# Patient Record
Sex: MALE | Race: BLACK OR AFRICAN AMERICAN | Employment: UNEMPLOYED | URBAN - METROPOLITAN AREA
[De-identification: names, ages, dates, MRNs, and addresses within clinical notes are randomized per-mention and may not be internally consistent; named-entity substitution may affect disease eponyms.]

---

## 2017-06-05 ENCOUNTER — APPOINTMENT (OUTPATIENT)
Dept: CT IMAGING | Age: 37
End: 2017-06-05
Attending: EMERGENCY MEDICINE
Payer: MEDICAID

## 2017-06-05 ENCOUNTER — HOSPITAL ENCOUNTER (EMERGENCY)
Age: 37
Discharge: HOME | End: 2017-06-05
Attending: EMERGENCY MEDICINE
Payer: MEDICAID

## 2017-06-05 VITALS
RESPIRATION RATE: 16 BRPM | WEIGHT: 270.06 LBS | HEART RATE: 86 BPM | SYSTOLIC BLOOD PRESSURE: 178 MMHG | OXYGEN SATURATION: 100 % | TEMPERATURE: 98.3 F | DIASTOLIC BLOOD PRESSURE: 101 MMHG | HEIGHT: 74 IN | BODY MASS INDEX: 34.66 KG/M2

## 2017-06-05 DIAGNOSIS — R51.9 NONINTRACTABLE HEADACHE, UNSPECIFIED CHRONICITY PATTERN, UNSPECIFIED HEADACHE TYPE: Primary | ICD-10-CM

## 2017-06-05 DIAGNOSIS — J45.901 ASTHMA WITH ACUTE EXACERBATION, UNSPECIFIED ASTHMA SEVERITY: ICD-10-CM

## 2017-06-05 DIAGNOSIS — I15.9 SECONDARY HYPERTENSION: ICD-10-CM

## 2017-06-05 LAB
ALBUMIN SERPL BCP-MCNC: 3.7 G/DL (ref 3.4–5)
ALBUMIN/GLOB SERPL: 1.1 {RATIO} (ref 0.8–1.7)
ALP SERPL-CCNC: 59 U/L (ref 45–117)
ALT SERPL-CCNC: 23 U/L (ref 16–61)
ANION GAP BLD CALC-SCNC: 6 MMOL/L (ref 3–18)
AST SERPL W P-5'-P-CCNC: 18 U/L (ref 15–37)
BILIRUB SERPL-MCNC: 0.3 MG/DL (ref 0.2–1)
BUN SERPL-MCNC: 13 MG/DL (ref 7–18)
BUN/CREAT SERPL: 10 (ref 12–20)
CALCIUM SERPL-MCNC: 8.9 MG/DL (ref 8.5–10.1)
CHLORIDE SERPL-SCNC: 107 MMOL/L (ref 100–108)
CO2 SERPL-SCNC: 29 MMOL/L (ref 21–32)
CREAT SERPL-MCNC: 1.24 MG/DL (ref 0.6–1.3)
ERYTHROCYTE [DISTWIDTH] IN BLOOD BY AUTOMATED COUNT: 13.2 % (ref 11.6–14.5)
GLOBULIN SER CALC-MCNC: 3.5 G/DL (ref 2–4)
GLUCOSE SERPL-MCNC: 121 MG/DL (ref 74–99)
HCT VFR BLD AUTO: 42.8 % (ref 36–48)
HGB BLD-MCNC: 14.2 G/DL (ref 13–16)
MCH RBC QN AUTO: 29.5 PG (ref 24–34)
MCHC RBC AUTO-ENTMCNC: 33.2 G/DL (ref 31–37)
MCV RBC AUTO: 89 FL (ref 74–97)
PLATELET # BLD AUTO: 208 K/UL (ref 135–420)
PMV BLD AUTO: 10.2 FL (ref 9.2–11.8)
POTASSIUM SERPL-SCNC: 3.7 MMOL/L (ref 3.5–5.5)
PROT SERPL-MCNC: 7.2 G/DL (ref 6.4–8.2)
RBC # BLD AUTO: 4.81 M/UL (ref 4.7–5.5)
SODIUM SERPL-SCNC: 142 MMOL/L (ref 136–145)
WBC # BLD AUTO: 5.5 K/UL (ref 4.6–13.2)

## 2017-06-05 PROCEDURE — 74011000250 HC RX REV CODE- 250: Performed by: EMERGENCY MEDICINE

## 2017-06-05 PROCEDURE — 74011250637 HC RX REV CODE- 250/637: Performed by: EMERGENCY MEDICINE

## 2017-06-05 PROCEDURE — 99284 EMERGENCY DEPT VISIT MOD MDM: CPT

## 2017-06-05 PROCEDURE — 94640 AIRWAY INHALATION TREATMENT: CPT

## 2017-06-05 PROCEDURE — 80053 COMPREHEN METABOLIC PANEL: CPT | Performed by: EMERGENCY MEDICINE

## 2017-06-05 PROCEDURE — 77030029684 HC NEB SM VOL KT MONA -A

## 2017-06-05 PROCEDURE — 74011636637 HC RX REV CODE- 636/637: Performed by: EMERGENCY MEDICINE

## 2017-06-05 PROCEDURE — 96361 HYDRATE IV INFUSION ADD-ON: CPT

## 2017-06-05 PROCEDURE — 85027 COMPLETE CBC AUTOMATED: CPT | Performed by: EMERGENCY MEDICINE

## 2017-06-05 PROCEDURE — 74011250636 HC RX REV CODE- 250/636: Performed by: EMERGENCY MEDICINE

## 2017-06-05 PROCEDURE — 70450 CT HEAD/BRAIN W/O DYE: CPT

## 2017-06-05 PROCEDURE — 96360 HYDRATION IV INFUSION INIT: CPT

## 2017-06-05 RX ORDER — SODIUM CHLORIDE 9 MG/ML
250 INJECTION, SOLUTION INTRAVENOUS ONCE
Status: COMPLETED | OUTPATIENT
Start: 2017-06-05 | End: 2017-06-05

## 2017-06-05 RX ORDER — ALBUTEROL SULFATE 90 UG/1
2 AEROSOL, METERED RESPIRATORY (INHALATION)
Qty: 1 INHALER | Refills: 0 | Status: SHIPPED | OUTPATIENT
Start: 2017-06-05 | End: 2018-04-17

## 2017-06-05 RX ORDER — IPRATROPIUM BROMIDE AND ALBUTEROL SULFATE 2.5; .5 MG/3ML; MG/3ML
3 SOLUTION RESPIRATORY (INHALATION)
Status: COMPLETED | OUTPATIENT
Start: 2017-06-05 | End: 2017-06-05

## 2017-06-05 RX ORDER — NIFEDIPINE 30 MG/1
30 TABLET, EXTENDED RELEASE ORAL DAILY
Qty: 15 TAB | Refills: 0 | Status: SHIPPED | OUTPATIENT
Start: 2017-06-05 | End: 2019-04-26

## 2017-06-05 RX ORDER — METOCLOPRAMIDE 5 MG/1
10 TABLET ORAL
Status: COMPLETED | OUTPATIENT
Start: 2017-06-05 | End: 2017-06-05

## 2017-06-05 RX ORDER — PROMETHAZINE HYDROCHLORIDE 25 MG/ML
25 INJECTION, SOLUTION INTRAMUSCULAR; INTRAVENOUS ONCE
Status: DISCONTINUED | OUTPATIENT
Start: 2017-06-05 | End: 2017-06-05

## 2017-06-05 RX ORDER — METOCLOPRAMIDE 5 MG/1
5 TABLET ORAL
Qty: 12 TAB | Refills: 0 | Status: SHIPPED | OUTPATIENT
Start: 2017-06-05 | End: 2017-06-15

## 2017-06-05 RX ORDER — PREDNISONE 20 MG/1
60 TABLET ORAL
Status: COMPLETED | OUTPATIENT
Start: 2017-06-05 | End: 2017-06-05

## 2017-06-05 RX ORDER — NIFEDIPINE 30 MG/1
30 TABLET, EXTENDED RELEASE ORAL
Status: COMPLETED | OUTPATIENT
Start: 2017-06-05 | End: 2017-06-05

## 2017-06-05 RX ORDER — DIPHENHYDRAMINE HCL 25 MG
25 CAPSULE ORAL
Status: COMPLETED | OUTPATIENT
Start: 2017-06-05 | End: 2017-06-05

## 2017-06-05 RX ORDER — PREDNISONE 20 MG/1
60 TABLET ORAL DAILY
Qty: 15 TAB | Refills: 0 | Status: SHIPPED | OUTPATIENT
Start: 2017-06-05 | End: 2017-06-10

## 2017-06-05 RX ADMIN — IPRATROPIUM BROMIDE AND ALBUTEROL SULFATE 3 ML: .5; 3 SOLUTION RESPIRATORY (INHALATION) at 14:41

## 2017-06-05 RX ADMIN — SODIUM CHLORIDE 250 ML/HR: 900 INJECTION, SOLUTION INTRAVENOUS at 16:32

## 2017-06-05 RX ADMIN — NIFEDIPINE 30 MG: 30 TABLET, FILM COATED, EXTENDED RELEASE ORAL at 18:18

## 2017-06-05 RX ADMIN — SODIUM CHLORIDE 1000 ML: 900 INJECTION, SOLUTION INTRAVENOUS at 16:31

## 2017-06-05 RX ADMIN — DIPHENHYDRAMINE HYDROCHLORIDE 25 MG: 25 CAPSULE ORAL at 16:27

## 2017-06-05 RX ADMIN — IPRATROPIUM BROMIDE AND ALBUTEROL SULFATE 3 ML: .5; 3 SOLUTION RESPIRATORY (INHALATION) at 17:16

## 2017-06-05 RX ADMIN — IPRATROPIUM BROMIDE AND ALBUTEROL SULFATE 3 ML: .5; 3 SOLUTION RESPIRATORY (INHALATION) at 16:28

## 2017-06-05 RX ADMIN — METOCLOPRAMIDE HYDROCHLORIDE 10 MG: 5 TABLET ORAL at 16:28

## 2017-06-05 RX ADMIN — PREDNISONE 60 MG: 20 TABLET ORAL at 16:27

## 2017-06-05 NOTE — DISCHARGE INSTRUCTIONS
Asthma Attack: Care Instructions  Your Care Instructions    During an asthma attack, the airways swell and narrow. This makes it hard to breathe. Severe asthma attacks can be life-threatening, but you can help prevent them by keeping your asthma under control and treating symptoms before they get bad. Symptoms include being short of breath, having chest tightness, coughing, and wheezing. Noting and treating these symptoms can also help you avoid future trips to the emergency room. The doctor has checked you carefully, but problems can develop later. If you notice any problems or new symptoms, get medical treatment right away. Follow-up care is a key part of your treatment and safety. Be sure to make and go to all appointments, and call your doctor if you are having problems. It's also a good idea to know your test results and keep a list of the medicines you take. How can you care for yourself at home? · Follow your asthma action plan to prevent and treat attacks. If you don't have an asthma action plan, work with your doctor to create one. · Take your asthma medicines exactly as prescribed. Talk to your doctor right away if you have any questions about how to take them. ¨ Use your quick-relief medicine when you have symptoms of an attack. Quick-relief medicine is usually an albuterol inhaler. Some people need to use quick-relief medicine before they exercise. ¨ Take your controller medicine every day, not just when you have symptoms. Controller medicine is usually an inhaled corticosteroid. The goal is to prevent problems before they occur. Don't use your controller medicine to treat an attack that has already started. It doesn't work fast enough to help. ¨ If your doctor prescribed corticosteroid pills to use during an attack, take them exactly as prescribed. It may take hours for the pills to work, but they may make the episode shorter and help you breathe better.   ¨ Keep your quick-relief medicine with you at all times. · Talk to your doctor before using other medicines. Some medicines, such as aspirin, can cause asthma attacks in some people. · If you have a peak flow meter, use it to check how well you are breathing. This can help you predict when an asthma attack is going to occur. Then you can take medicine to prevent the asthma attack or make it less severe. · Do not smoke or allow others to smoke around you. Avoid smoky places. Smoking makes asthma worse. If you need help quitting, talk to your doctor about stop-smoking programs and medicines. These can increase your chances of quitting for good. · Learn what triggers an asthma attack for you, and avoid the triggers when you can. Common triggers include colds, smoke, air pollution, dust, pollen, mold, pets, cockroaches, stress, and cold air. · Avoid colds and the flu. Get a pneumococcal vaccine shot. If you have had one before, ask your doctor if you need a second dose. Get a flu vaccine every fall. If you must be around people with colds or the flu, wash your hands often. When should you call for help? Call 911 anytime you think you may need emergency care. For example, call if:  · You have severe trouble breathing. Call your doctor now or seek immediate medical care if:  · Your symptoms do not get better after you have followed your asthma action plan. · You have new or worse trouble breathing. · Your coughing and wheezing get worse. · You cough up dark brown or bloody mucus (sputum). · You have a new or higher fever. Watch closely for changes in your health, and be sure to contact your doctor if:  · You need to use quick-relief medicine on more than 2 days a week (unless it is just for exercise). · You cough more deeply or more often, especially if you notice more mucus or a change in the color of your mucus. · You are not getting better as expected. Where can you learn more?   Go to http://bianca-arturo.info/. Enter A617 in the search box to learn more about \"Asthma Attack: Care Instructions. \"  Current as of: May 23, 2016  Content Version: 11.2  © 5388-4379 [employer]. Care instructions adapted under license by [employer] (which disclaims liability or warranty for this information). If you have questions about a medical condition or this instruction, always ask your healthcare professional. [address] any warranty or liability for your use of this information. Headache: Care Instructions  Your Care Instructions    Headaches have many possible causes. Most headaches aren't a sign of a more serious problem, and they will get better on their own. Home treatment may help you feel better faster. The doctor has checked you carefully, but problems can develop later. If you notice any problems or new symptoms, get medical treatment right away. Follow-up care is a key part of your treatment and safety. Be sure to make and go to all appointments, and call your doctor if you are having problems. It's also a good idea to know your test results and keep a list of the medicines you take. How can you care for yourself at home? · Do not drive if you have taken a prescription pain medicine. · Rest in a quiet, dark room until your headache is gone. Close your eyes and try to relax or go to sleep. Don't watch TV or read. · Put a cold, moist cloth or cold pack on the painful area for 10 to 20 minutes at a time. Put a thin cloth between the cold pack and your skin. · Use a warm, moist towel or a heating pad set on low to relax tight shoulder and neck muscles. · Have someone gently massage your neck and shoulders. · Take pain medicines exactly as directed. ¨ If the doctor gave you a prescription medicine for pain, take it as prescribed.   ¨ If you are not taking a prescription pain medicine, ask your doctor if you can take an over-the-counter medicine. · Be careful not to take pain medicine more often than the instructions allow, because you may get worse or more frequent headaches when the medicine wears off. · Do not ignore new symptoms that occur with a headache, such as a fever, weakness or numbness, vision changes, or confusion. These may be signs of a more serious problem. To prevent headaches  · Keep a headache diary so you can figure out what triggers your headaches. Avoiding triggers may help you prevent headaches. Record when each headache began, how long it lasted, and what the pain was like (throbbing, aching, stabbing, or dull). Write down any other symptoms you had with the headache, such as nausea, flashing lights or dark spots, or sensitivity to bright light or loud noise. Note if the headache occurred near your period. List anything that might have triggered the headache, such as certain foods (chocolate, cheese, wine) or odors, smoke, bright light, stress, or lack of sleep. · Find healthy ways to deal with stress. Headaches are most common during or right after stressful times. Take time to relax before and after you do something that has caused a headache in the past.  · Try to keep your muscles relaxed by keeping good posture. Check your jaw, face, neck, and shoulder muscles for tension, and try relaxing them. When sitting at a desk, change positions often, and stretch for 30 seconds each hour. · Get plenty of sleep and exercise. · Eat regularly and well. Long periods without food can trigger a headache. · Treat yourself to a massage. Some people find that regular massages are very helpful in relieving tension. · Limit caffeine by not drinking too much coffee, tea, or soda. But don't quit caffeine suddenly, because that can also give you headaches. · Reduce eyestrain from computers by blinking frequently and looking away from the computer screen every so often.  Make sure you have proper eyewear and that your monitor is set up properly, about an arm's length away. · Seek help if you have depression or anxiety. Your headaches may be linked to these conditions. Treatment can both prevent headaches and help with symptoms of anxiety or depression. When should you call for help? Call 911 anytime you think you may need emergency care. For example, call if:  · You have signs of a stroke. These may include:  ¨ Sudden numbness, paralysis, or weakness in your face, arm, or leg, especially on only one side of your body. ¨ Sudden vision changes. ¨ Sudden trouble speaking. ¨ Sudden confusion or trouble understanding simple statements. ¨ Sudden problems with walking or balance. ¨ A sudden, severe headache that is different from past headaches. Call your doctor now or seek immediate medical care if:  · You have a new or worse headache. · Your headache gets much worse. Where can you learn more? Go to http://bianca-arturo.info/. Enter M271 in the search box to learn more about \"Headache: Care Instructions. \"  Current as of: October 14, 2016  Content Version: 11.2  © 9584-0696 [employer]. Care instructions adapted under license by [employer] (which disclaims liability or warranty for this information). If you have questions about a medical condition or this instruction, always ask your healthcare professional. [address] any warranty or liability for your use of this information. High Blood Pressure: Care Instructions  Your Care Instructions  If your blood pressure is usually above 140/90, you have high blood pressure, or hypertension. That means the top number is 140 or higher or the bottom number is 90 or higher, or both. Despite what a lot of people think, high blood pressure usually doesn't cause headaches or make you feel dizzy or lightheaded. It usually has no symptoms.  But it does increase your risk for heart attack, stroke, and kidney or eye damage. The higher your blood pressure, the more your risk increases. Your doctor will give you a goal for your blood pressure. Your goal will be based on your health and your age. An example of a goal is to keep your blood pressure below 140/90. Lifestyle changes, such as eating healthy and being active, are always important to help lower blood pressure. You might also take medicine to reach your blood pressure goal.  Follow-up care is a key part of your treatment and safety. Be sure to make and go to all appointments, and call your doctor if you are having problems. It's also a good idea to know your test results and keep a list of the medicines you take. How can you care for yourself at home? Medical treatment  · If you stop taking your medicine, your blood pressure will go back up. You may take one or more types of medicine to lower your blood pressure. Be safe with medicines. Take your medicine exactly as prescribed. Call your doctor if you think you are having a problem with your medicine. · Talk to your doctor before you start taking aspirin every day. Aspirin can help certain people lower their risk of a heart attack or stroke. But taking aspirin isn't right for everyone, because it can cause serious bleeding. · See your doctor regularly. You may need to see the doctor more often at first or until your blood pressure comes down. · If you are taking blood pressure medicine, talk to your doctor before you take decongestants or anti-inflammatory medicine, such as ibuprofen. Some of these medicines can raise blood pressure. · Learn how to check your blood pressure at home. Lifestyle changes  · Stay at a healthy weight. This is especially important if you put on weight around the waist. Losing even 10 pounds can help you lower your blood pressure. · If your doctor recommends it, get more exercise. Walking is a good choice. Bit by bit, increase the amount you walk every day.  Try for at least 30 minutes on most days of the week. You also may want to swim, bike, or do other activities. · Avoid or limit alcohol. Talk to your doctor about whether you can drink any alcohol. · Try to limit how much sodium you eat to less than 2,300 milligrams (mg) a day. Your doctor may ask you to try to eat less than 1,500 mg a day. · Eat plenty of fruits (such as bananas and oranges), vegetables, legumes, whole grains, and low-fat dairy products. · Lower the amount of saturated fat in your diet. Saturated fat is found in animal products such as milk, cheese, and meat. Limiting these foods may help you lose weight and also lower your risk for heart disease. · Do not smoke. Smoking increases your risk for heart attack and stroke. If you need help quitting, talk to your doctor about stop-smoking programs and medicines. These can increase your chances of quitting for good. When should you call for help? Call 911 anytime you think you may need emergency care. This may mean having symptoms that suggest that your blood pressure is causing a serious heart or blood vessel problem. Your blood pressure may be over 180/110. For example, call 911 if:  · You have symptoms of a heart attack. These may include:  ¨ Chest pain or pressure, or a strange feeling in the chest.  ¨ Sweating. ¨ Shortness of breath. ¨ Nausea or vomiting. ¨ Pain, pressure, or a strange feeling in the back, neck, jaw, or upper belly or in one or both shoulders or arms. ¨ Lightheadedness or sudden weakness. ¨ A fast or irregular heartbeat. · You have symptoms of a stroke. These may include:  ¨ Sudden numbness, tingling, weakness, or loss of movement in your face, arm, or leg, especially on only one side of your body. ¨ Sudden vision changes. ¨ Sudden trouble speaking. ¨ Sudden confusion or trouble understanding simple statements. ¨ Sudden problems with walking or balance. ¨ A sudden, severe headache that is different from past headaches.   · You have severe back or belly pain. Do not wait until your blood pressure comes down on its own. Get help right away. Call your doctor now or seek immediate care if:  · Your blood pressure is much higher than normal (such as 180/110 or higher), but you don't have symptoms. · You think high blood pressure is causing symptoms, such as:  ¨ Severe headache. ¨ Blurry vision. Watch closely for changes in your health, and be sure to contact your doctor if:  · Your blood pressure measures 140/90 or higher at least 2 times. That means the top number is 140 or higher or the bottom number is 90 or higher, or both. · You think you may be having side effects from your blood pressure medicine. · Your blood pressure is usually normal, but it goes above normal at least 2 times. Where can you learn more? Go to http://bianca-arturo.info/. Enter I591 in the search box to learn more about \"High Blood Pressure: Care Instructions. \"  Current as of: August 8, 2016  Content Version: 11.2  © 6356-1730 [employer]. Care instructions adapted under license by [employer] (which disclaims liability or warranty for this information). If you have questions about a medical condition or this instruction, always ask your healthcare professional. [address] any warranty or liability for your use of this information.

## 2017-06-05 NOTE — ED PROVIDER NOTES
HPI Comments: 3:39 PM Jeri Stafford is a 39 y.o. male with h/o asthma who presents to ED complaining of headache onset 2 weeks ago. Patient states the headache is constant but does rarely resolve. Headache is mostly over his temporal and parietal regions bilaterally. Patient took ASA and Tylenol with no relief. He states headache keeps him from sleeping. Headache is exacerbated when sitting up or standing up. No head injury. No hx of seizure. Patient denies weakness, fever, chills, vomiting, diarrhea, visual changes, and incontinence. Patient states he had MI last year. No other concerns or symptoms at this time. PCP: None    The history is provided by the patient. Past Medical History:   Diagnosis Date    Asthma        History reviewed. No pertinent surgical history. History reviewed. No pertinent family history. Social History     Social History    Marital status: SINGLE     Spouse name: N/A    Number of children: N/A    Years of education: N/A     Occupational History    Not on file. Social History Main Topics    Smoking status: Current Some Day Smoker    Smokeless tobacco: Not on file    Alcohol use No    Drug use: Not on file    Sexual activity: Not on file     Other Topics Concern    Not on file     Social History Narrative         ALLERGIES: Review of patient's allergies indicates no known allergies. Review of Systems   Constitutional: Negative for chills and fever. HENT: Negative for congestion, rhinorrhea and sore throat. Eyes: Negative for visual disturbance. Respiratory: Negative for cough and shortness of breath. Cardiovascular: Negative for chest pain and leg swelling. Gastrointestinal: Negative for abdominal pain, diarrhea, nausea and vomiting. Genitourinary: Negative for dysuria and frequency. Musculoskeletal: Negative for arthralgias, back pain and myalgias. Skin: Negative for rash and wound. Neurological: Positive for headaches.  Negative for dizziness, weakness and numbness. Vitals:    06/05/17 1722 06/05/17 1723 06/05/17 1724 06/05/17 1725   BP:       Pulse:       Resp:       Temp:       SpO2: 98% 98% 95% 95%   Weight:       Height:                Physical Exam   Constitutional: He is oriented to person, place, and time. He appears well-developed and well-nourished. No distress. HENT:   Head: Normocephalic and atraumatic. Mouth/Throat: Oropharynx is clear and moist.   Eyes: Conjunctivae and EOM are normal. Pupils are equal, round, and reactive to light. No scleral icterus. No inflammation. Eyes normal bilaterally. Neck: Normal range of motion. Neck supple. Cardiovascular: Normal rate, regular rhythm and normal heart sounds. No murmur heard. Pulmonary/Chest: Effort normal. No respiratory distress. He has wheezes (bilaterally). Abdominal: Soft. Bowel sounds are normal. He exhibits no distension. There is no tenderness. Musculoskeletal: He exhibits no edema. Lymphadenopathy:     He has no cervical adenopathy. Neurological: He is alert and oriented to person, place, and time. Coordination normal.   Skin: Skin is warm and dry. No rash noted. Psychiatric: He has a normal mood and affect. His behavior is normal.   Nursing note and vitals reviewed. MDM  Number of Diagnoses or Management Options  Asthma with acute exacerbation, unspecified asthma severity:   Nonintractable headache, unspecified chronicity pattern, unspecified headache type:   Secondary hypertension:   Diagnosis management comments: Patient with headache. Not sudden, not worse, no family with subarachnoid. No constant headache. Prolonged intermittent headache. CT brain because of history of uncontrolled HTN. Will check creatinine for concern for uncontrolled HTN related to it. Will check patient's hemoglobin. Patient is also wheezing. Symptomatic treatments and steroids for home. Patient will need to follow up for long term care for his HTN.  This has been discussed.      ED Course       Procedures      Vitals:  Patient Vitals for the past 12 hrs:   Temp Pulse Resp BP SpO2   06/05/17 1725 - - - - 95 %   06/05/17 1724 - - - - 95 %   06/05/17 1723 - - - - 98 %   06/05/17 1722 - - - - 98 %   06/05/17 1721 - - - - 98 %   06/05/17 1710 - - - (!) 162/114 -   06/05/17 1701 - - - (!) 150/116 -   06/05/17 1620 - - - (!) 171/116 -   06/05/17 1610 - - - (!) 171/123 -   06/05/17 1600 - - - (!) 185/114 -   06/05/17 1558 98.3 °F (36.8 °C) 86 16 (!) 178/112 98 %   06/05/17 1557 - - - (!) 178/115 -   06/05/17 1545 - - - (!) 168/123 -   06/05/17 1543 - - - (!) 186/125 -   06/05/17 1535 - - - (!) 189/128 -        Medications ordered:   Medications   NIFEdipine ER (PROCARDIA XL) tablet 30 mg (not administered)   diphenhydrAMINE (BENADRYL) capsule 25 mg (25 mg Oral Given 6/5/17 1627)   metoclopramide HCl (REGLAN) tablet 10 mg (10 mg Oral Given 6/5/17 1628)   0.9% sodium chloride infusion (250 mL/hr IntraVENous New Bag 6/5/17 1632)   sodium chloride 0.9 % bolus infusion 1,000 mL (1,000 mL IntraVENous New Bag 6/5/17 1631)   albuterol-ipratropium (DUO-NEB) 2.5 MG-0.5 MG/3 ML (3 mL Nebulization Given 6/5/17 1716)   predniSONE (DELTASONE) tablet 60 mg (60 mg Oral Given 6/5/17 1627)         Lab findings:  Recent Results (from the past 12 hour(s))   CBC W/O DIFF    Collection Time: 06/05/17  3:58 PM   Result Value Ref Range    WBC 5.5 4.6 - 13.2 K/uL    RBC 4.81 4.70 - 5.50 M/uL    HGB 14.2 13.0 - 16.0 g/dL    HCT 42.8 36.0 - 48.0 %    MCV 89.0 74.0 - 97.0 FL    MCH 29.5 24.0 - 34.0 PG    MCHC 33.2 31.0 - 37.0 g/dL    RDW 13.2 11.6 - 14.5 %    PLATELET 168 685 - 530 K/uL    MPV 10.2 9.2 - 53.5 FL   METABOLIC PANEL, COMPREHENSIVE    Collection Time: 06/05/17  3:58 PM   Result Value Ref Range    Sodium 142 136 - 145 mmol/L    Potassium 3.7 3.5 - 5.5 mmol/L    Chloride 107 100 - 108 mmol/L    CO2 29 21 - 32 mmol/L    Anion gap 6 3.0 - 18 mmol/L    Glucose 121 (H) 74 - 99 mg/dL    BUN 13 7.0 - 18 MG/DL    Creatinine 1.24 0.6 - 1.3 MG/DL    BUN/Creatinine ratio 10 (L) 12 - 20      GFR est AA >60 >60 ml/min/1.73m2    GFR est non-AA >60 >60 ml/min/1.73m2    Calcium 8.9 8.5 - 10.1 MG/DL    Bilirubin, total 0.3 0.2 - 1.0 MG/DL    ALT (SGPT) 23 16 - 61 U/L    AST (SGOT) 18 15 - 37 U/L    Alk. phosphatase 59 45 - 117 U/L    Protein, total 7.2 6.4 - 8.2 g/dL    Albumin 3.7 3.4 - 5.0 g/dL    Globulin 3.5 2.0 - 4.0 g/dL    A-G Ratio 1.1 0.8 - 1.7         EKG interpretation by ED Physician:       X-Ray, CT or other radiology findings or impressions:  CT HEAD WO CONT   Final Result          Orders:  Orders Placed This Encounter    CT HEAD WO CONT     Standing Status:   Standing     Number of Occurrences:   1     Order Specific Question:   Transport     Answer:   Bushra [5]     Order Specific Question:   Is Patient Allergic to Contrast Dye? Answer:   Unknown    CBC W/O DIFF     Standing Status:   Standing     Number of Occurrences:   1    METABOLIC PANEL, COMPREHENSIVE     Standing Status:   Standing     Number of Occurrences:   1    VITAL SIGNS     Standing Status:   Standing     Number of Occurrences:   1    diphenhydrAMINE (BENADRYL) capsule 25 mg    metoclopramide HCl (REGLAN) tablet 10 mg    0.9% sodium chloride infusion    sodium chloride 0.9 % bolus infusion 1,000 mL    albuterol-ipratropium (DUO-NEB) 2.5 MG-0.5 MG/3 ML     Order Specific Question:   MODE OF DELIVERY     Answer:   Nebulizer    predniSONE (DELTASONE) tablet 60 mg    predniSONE (DELTASONE) 20 mg tablet     Sig: Take 3 Tabs by mouth daily for 5 days. Dispense:  15 Tab     Refill:  0    albuterol (PROVENTIL HFA, VENTOLIN HFA, PROAIR HFA) 90 mcg/actuation inhaler     Sig: Take 2 Puffs by inhalation every four (4) hours as needed for Wheezing. Dispense:  1 Inhaler     Refill:  0    inhalational spacing device     Si Each by Does Not Apply route as needed.      Dispense:  1 Device     Refill:  0    metoclopramide HCl (REGLAN) 5 mg tablet     Sig: Take 1 Tab by mouth every six (6) hours as needed for Nausea for up to 10 days. Dispense:  12 Tab     Refill:  0    NIFEdipine ER (PROCARDIA XL) tablet 30 mg       Reevaluation, Progress notes, Consult notes, or additional Procedure notes:   6:07 PM Patient is without headache, requesting some blood pressure medications. He cannot recall the meds he used to take. Will start him on Procardia low dose. Patient is to follow up in an outpatient clinic for further blood pressure management. Disposition:  Diagnosis:   1. Nonintractable headache, unspecified chronicity pattern, unspecified headache type    2. Secondary hypertension    3. Asthma with acute exacerbation, unspecified asthma severity        Disposition: Discharged    Follow-up Information     None           Patient's Medications   Start Taking    ALBUTEROL (PROVENTIL HFA, VENTOLIN HFA, PROAIR HFA) 90 MCG/ACTUATION INHALER    Take 2 Puffs by inhalation every four (4) hours as needed for Wheezing. INHALATIONAL SPACING DEVICE    1 Each by Does Not Apply route as needed. METOCLOPRAMIDE HCL (REGLAN) 5 MG TABLET    Take 1 Tab by mouth every six (6) hours as needed for Nausea for up to 10 days. PREDNISONE (DELTASONE) 20 MG TABLET    Take 3 Tabs by mouth daily for 5 days. Continue Taking    ALBUTEROL (PROVENTIL HFA, VENTOLIN HFA, PROAIR HFA) 90 MCG/ACTUATION INHALER    Take 2 puffs by inhalation every four (4) hours as needed for Wheezing. These Medications have changed    No medications on file   Stop Taking    No medications on file         [id number] [facility] for and in the presence of Kimberly Baeza MD (06/05/17)      Physician Attestation  I personally performed the services described in this documentation, reviewed and edited the documentation which was dictated to the scribe in my presence, and it accurately records my words and actions.     Kimberly Baeza MD (06/05/17)      Signed by: Jennifer Browne, [id number] [address], June 05, 2017 at 6:08 PM

## 2018-04-17 ENCOUNTER — APPOINTMENT (OUTPATIENT)
Dept: GENERAL RADIOLOGY | Age: 38
DRG: 383 | End: 2018-04-17
Attending: PHYSICIAN ASSISTANT
Payer: MEDICAID

## 2018-04-17 ENCOUNTER — APPOINTMENT (OUTPATIENT)
Dept: CT IMAGING | Age: 38
DRG: 383 | End: 2018-04-17
Attending: EMERGENCY MEDICINE
Payer: MEDICAID

## 2018-04-17 ENCOUNTER — HOSPITAL ENCOUNTER (INPATIENT)
Age: 38
LOS: 2 days | Discharge: HOME HEALTH | DRG: 383 | End: 2018-04-19
Attending: EMERGENCY MEDICINE | Admitting: HOSPITALIST
Payer: MEDICAID

## 2018-04-17 DIAGNOSIS — L03.116 LEFT LEG CELLULITIS: ICD-10-CM

## 2018-04-17 DIAGNOSIS — L08.9 LEFT FOOT INFECTION: Primary | ICD-10-CM

## 2018-04-17 PROBLEM — L03.90 CELLULITIS: Status: ACTIVE | Noted: 2018-04-17

## 2018-04-17 LAB
ANION GAP SERPL CALC-SCNC: 7 MMOL/L (ref 3–18)
BASOPHILS # BLD: 0.1 K/UL (ref 0–0.06)
BASOPHILS NFR BLD: 1 % (ref 0–2)
BUN SERPL-MCNC: 12 MG/DL (ref 7–18)
BUN/CREAT SERPL: 10 (ref 12–20)
CALCIUM SERPL-MCNC: 8.7 MG/DL (ref 8.5–10.1)
CHLORIDE SERPL-SCNC: 108 MMOL/L (ref 100–108)
CO2 SERPL-SCNC: 29 MMOL/L (ref 21–32)
CREAT SERPL-MCNC: 1.21 MG/DL (ref 0.6–1.3)
CRP SERPL-MCNC: 0.3 MG/DL (ref 0–0.3)
DIFFERENTIAL METHOD BLD: ABNORMAL
EOSINOPHIL # BLD: 0.3 K/UL (ref 0–0.4)
EOSINOPHIL NFR BLD: 7 % (ref 0–5)
ERYTHROCYTE [DISTWIDTH] IN BLOOD BY AUTOMATED COUNT: 13.2 % (ref 11.6–14.5)
ERYTHROCYTE [SEDIMENTATION RATE] IN BLOOD: 3 MM/HR (ref 0–15)
GLUCOSE SERPL-MCNC: 110 MG/DL (ref 74–99)
HCT VFR BLD AUTO: 47.9 % (ref 36–48)
HGB BLD-MCNC: 15.8 G/DL (ref 13–16)
LACTATE BLD-SCNC: 1.1 MMOL/L (ref 0.4–2)
LYMPHOCYTES # BLD: 1.2 K/UL (ref 0.9–3.6)
LYMPHOCYTES NFR BLD: 33 % (ref 21–52)
MCH RBC QN AUTO: 28.9 PG (ref 24–34)
MCHC RBC AUTO-ENTMCNC: 33 G/DL (ref 31–37)
MCV RBC AUTO: 87.6 FL (ref 74–97)
MONOCYTES # BLD: 0.3 K/UL (ref 0.05–1.2)
MONOCYTES NFR BLD: 7 % (ref 3–10)
NEUTS SEG # BLD: 1.9 K/UL (ref 1.8–8)
NEUTS SEG NFR BLD: 52 % (ref 40–73)
PLATELET # BLD AUTO: 192 K/UL (ref 135–420)
PMV BLD AUTO: 10 FL (ref 9.2–11.8)
POTASSIUM SERPL-SCNC: 4.2 MMOL/L (ref 3.5–5.5)
RBC # BLD AUTO: 5.47 M/UL (ref 4.7–5.5)
SODIUM SERPL-SCNC: 144 MMOL/L (ref 136–145)
WBC # BLD AUTO: 3.7 K/UL (ref 4.6–13.2)

## 2018-04-17 PROCEDURE — 80048 BASIC METABOLIC PNL TOTAL CA: CPT | Performed by: PHYSICIAN ASSISTANT

## 2018-04-17 PROCEDURE — 87040 BLOOD CULTURE FOR BACTERIA: CPT | Performed by: PHYSICIAN ASSISTANT

## 2018-04-17 PROCEDURE — 96365 THER/PROPH/DIAG IV INF INIT: CPT

## 2018-04-17 PROCEDURE — 74011636320 HC RX REV CODE- 636/320: Performed by: EMERGENCY MEDICINE

## 2018-04-17 PROCEDURE — 99282 EMERGENCY DEPT VISIT SF MDM: CPT

## 2018-04-17 PROCEDURE — 74011000258 HC RX REV CODE- 258: Performed by: HOSPITALIST

## 2018-04-17 PROCEDURE — 85652 RBC SED RATE AUTOMATED: CPT | Performed by: PHYSICIAN ASSISTANT

## 2018-04-17 PROCEDURE — 74011250636 HC RX REV CODE- 250/636: Performed by: HOSPITALIST

## 2018-04-17 PROCEDURE — 74011250636 HC RX REV CODE- 250/636: Performed by: PHYSICIAN ASSISTANT

## 2018-04-17 PROCEDURE — 73630 X-RAY EXAM OF FOOT: CPT

## 2018-04-17 PROCEDURE — 74011250637 HC RX REV CODE- 250/637: Performed by: HOSPITALIST

## 2018-04-17 PROCEDURE — 96367 TX/PROPH/DG ADDL SEQ IV INF: CPT

## 2018-04-17 PROCEDURE — 83605 ASSAY OF LACTIC ACID: CPT

## 2018-04-17 PROCEDURE — 85025 COMPLETE CBC W/AUTO DIFF WBC: CPT | Performed by: PHYSICIAN ASSISTANT

## 2018-04-17 PROCEDURE — 74011250636 HC RX REV CODE- 250/636: Performed by: EMERGENCY MEDICINE

## 2018-04-17 PROCEDURE — 86140 C-REACTIVE PROTEIN: CPT | Performed by: PHYSICIAN ASSISTANT

## 2018-04-17 PROCEDURE — 74011000258 HC RX REV CODE- 258: Performed by: PHYSICIAN ASSISTANT

## 2018-04-17 PROCEDURE — 74011250637 HC RX REV CODE- 250/637: Performed by: INTERNAL MEDICINE

## 2018-04-17 PROCEDURE — 74011250637 HC RX REV CODE- 250/637: Performed by: PHYSICIAN ASSISTANT

## 2018-04-17 PROCEDURE — 90715 TDAP VACCINE 7 YRS/> IM: CPT | Performed by: PHYSICIAN ASSISTANT

## 2018-04-17 PROCEDURE — 77030020256 HC SOL INJ NACL 0.9%  500ML

## 2018-04-17 PROCEDURE — 65270000029 HC RM PRIVATE

## 2018-04-17 PROCEDURE — 77030020263 HC SOL INJ SOD CL0.9% LFCR 1000ML

## 2018-04-17 PROCEDURE — 73701 CT LOWER EXTREMITY W/DYE: CPT

## 2018-04-17 PROCEDURE — 90471 IMMUNIZATION ADMIN: CPT

## 2018-04-17 RX ORDER — SODIUM CHLORIDE 9 MG/ML
50 INJECTION, SOLUTION INTRAVENOUS CONTINUOUS
Status: DISCONTINUED | OUTPATIENT
Start: 2018-04-17 | End: 2018-04-19 | Stop reason: HOSPADM

## 2018-04-17 RX ORDER — ACETAMINOPHEN 500 MG
1000 TABLET ORAL
Status: COMPLETED | OUTPATIENT
Start: 2018-04-17 | End: 2018-04-17

## 2018-04-17 RX ORDER — NIFEDIPINE 30 MG/1
30 TABLET, EXTENDED RELEASE ORAL DAILY
Status: DISCONTINUED | OUTPATIENT
Start: 2018-04-18 | End: 2018-04-17 | Stop reason: SDUPTHER

## 2018-04-17 RX ORDER — LEVOFLOXACIN 5 MG/ML
750 INJECTION, SOLUTION INTRAVENOUS
Status: COMPLETED | OUTPATIENT
Start: 2018-04-17 | End: 2018-04-17

## 2018-04-17 RX ORDER — ONDANSETRON 2 MG/ML
4 INJECTION INTRAMUSCULAR; INTRAVENOUS
Status: DISCONTINUED | OUTPATIENT
Start: 2018-04-17 | End: 2018-04-19 | Stop reason: HOSPADM

## 2018-04-17 RX ORDER — ENOXAPARIN SODIUM 100 MG/ML
40 INJECTION SUBCUTANEOUS EVERY 24 HOURS
Status: DISCONTINUED | OUTPATIENT
Start: 2018-04-17 | End: 2018-04-19 | Stop reason: HOSPADM

## 2018-04-17 RX ORDER — NIFEDIPINE 30 MG/1
30 TABLET, EXTENDED RELEASE ORAL DAILY
Status: DISCONTINUED | OUTPATIENT
Start: 2018-04-17 | End: 2018-04-18

## 2018-04-17 RX ORDER — CLINDAMYCIN HYDROCHLORIDE 150 MG/1
300 CAPSULE ORAL EVERY 6 HOURS
Status: DISCONTINUED | OUTPATIENT
Start: 2018-04-17 | End: 2018-04-19 | Stop reason: HOSPADM

## 2018-04-17 RX ORDER — HYDROCODONE BITARTRATE AND ACETAMINOPHEN 5; 325 MG/1; MG/1
1 TABLET ORAL
Status: DISCONTINUED | OUTPATIENT
Start: 2018-04-17 | End: 2018-04-19 | Stop reason: HOSPADM

## 2018-04-17 RX ORDER — ACETAMINOPHEN 325 MG/1
650 TABLET ORAL
Status: DISCONTINUED | OUTPATIENT
Start: 2018-04-17 | End: 2018-04-19 | Stop reason: HOSPADM

## 2018-04-17 RX ORDER — ALBUTEROL SULFATE 90 UG/1
2 AEROSOL, METERED RESPIRATORY (INHALATION)
COMMUNITY
End: 2019-04-26

## 2018-04-17 RX ADMIN — SODIUM CHLORIDE 50 ML/HR: 900 INJECTION, SOLUTION INTRAVENOUS at 15:53

## 2018-04-17 RX ADMIN — PIPERACILLIN SODIUM,TAZOBACTAM SODIUM 3.38 G: 3; .375 INJECTION, POWDER, FOR SOLUTION INTRAVENOUS at 21:01

## 2018-04-17 RX ADMIN — ENOXAPARIN SODIUM 40 MG: 40 INJECTION SUBCUTANEOUS at 16:59

## 2018-04-17 RX ADMIN — NIFEDIPINE 30 MG: 30 TABLET, FILM COATED, EXTENDED RELEASE ORAL at 21:19

## 2018-04-17 RX ADMIN — ACETAMINOPHEN 1000 MG: 500 TABLET, FILM COATED ORAL at 13:41

## 2018-04-17 RX ADMIN — SODIUM CHLORIDE 2000 MG: 900 INJECTION, SOLUTION INTRAVENOUS at 19:57

## 2018-04-17 RX ADMIN — TETANUS TOXOID, REDUCED DIPHTHERIA TOXOID AND ACELLULAR PERTUSSIS VACCINE, ADSORBED 0.5 ML: 5; 2.5; 8; 8; 2.5 SUSPENSION INTRAMUSCULAR at 13:45

## 2018-04-17 RX ADMIN — PIPERACILLIN SODIUM,TAZOBACTAM SODIUM 3.38 G: 3; .375 INJECTION, POWDER, FOR SOLUTION INTRAVENOUS at 16:59

## 2018-04-17 RX ADMIN — CEFTRIAXONE 1 G: 1 INJECTION, POWDER, FOR SOLUTION INTRAMUSCULAR; INTRAVENOUS at 13:41

## 2018-04-17 RX ADMIN — CLINDAMYCIN HYDROCHLORIDE 300 MG: 150 CAPSULE ORAL at 19:55

## 2018-04-17 RX ADMIN — IOPAMIDOL 87 ML: 612 INJECTION, SOLUTION INTRAVENOUS at 15:10

## 2018-04-17 RX ADMIN — LEVOFLOXACIN 750 MG: 5 INJECTION, SOLUTION INTRAVENOUS at 13:50

## 2018-04-17 NOTE — ED PROVIDER NOTES
EMERGENCY DEPARTMENT HISTORY AND PHYSICAL EXAM    1:15 PM      Date: 4/17/2018  Patient Name: Evonne Harding    History of Presenting Illness     Chief Complaint   Patient presents with    Foot Injury         History Provided By: Patient    Chief Complaint: L foot infection  Duration:  2 weeks  Timing:  Progressive and Worsening  Location: L foot  Quality: N/A  Severity: N/A  Modifying Factors: None  Associated Symptoms: pus drainage, edema and odor      Additional History (Context): Evonne Harding is a 44 y.o. male with PMHx of asthma and HTN who presents to the ED with c/o L foot infection progressively worsening after stepping on a nail 2 weeks ago. Associated symptoms include pus drainage, edema and odor. Denies recent abx use. Admits to \"heaviness\" in L foot with plantarflexion. Reports last tetanus shot was last year. No modifying or aggravating factors were reported. No other symptoms or concerns were expressed. PCP: None    Current Facility-Administered Medications   Medication Dose Route Frequency Provider Last Rate Last Dose    levoFLOXacin (LEVAQUIN) 750 mg in D5W IVPB  750 mg IntraVENous NOW Sarina Dumont  mL/hr at 04/17/18 1350 750 mg at 04/17/18 1350     Current Outpatient Prescriptions   Medication Sig Dispense Refill    NIFEdipine ER (PROCARDIA XL) 30 mg ER tablet Take 1 Tab by mouth daily. 15 Tab 0       Past History     Past Medical History:  Past Medical History:   Diagnosis Date    Asthma     Hypertension        Past Surgical History:  History reviewed. No pertinent surgical history. Family History:  History reviewed. No pertinent family history. Social History:  Social History   Substance Use Topics    Smoking status: Current Some Day Smoker    Smokeless tobacco: Never Used    Alcohol use No       Allergies:  No Known Allergies      Review of Systems       Review of Systems   Constitutional: Negative for activity change, fatigue and fever.    HENT: Negative for congestion and rhinorrhea. Eyes: Negative for visual disturbance. Respiratory: Negative for shortness of breath. Cardiovascular: Negative for chest pain and palpitations. Gastrointestinal: Negative for abdominal pain, diarrhea, nausea and vomiting. Genitourinary: Negative for dysuria and hematuria. Musculoskeletal: Negative for back pain. Positive for L foot swelling   Skin: Positive for wound. Negative for rash. Neurological: Negative for dizziness, weakness and light-headedness. Psychiatric/Behavioral: Negative for agitation. All other systems reviewed and are negative. Physical Exam     Visit Vitals    BP (!) 161/106    Pulse 77    Temp 97.8 °F (36.6 °C)    Resp 18    Ht 6' 2\" (1.88 m)    Wt 132.9 kg (292 lb 14.4 oz)    SpO2 100%    BMI 37.61 kg/m2         Physical Exam   Constitutional: He is oriented to person, place, and time. He appears well-developed and well-nourished. No distress. HENT:   Head: Normocephalic and atraumatic. Eyes: Conjunctivae are normal.   Neck: Normal range of motion. Neck supple. Cardiovascular: Normal rate, regular rhythm, normal heart sounds and intact distal pulses. Pulmonary/Chest: Effort normal and breath sounds normal.   Abdominal: Soft. Bowel sounds are normal. He exhibits no distension. There is no tenderness. Musculoskeletal: Normal range of motion. He exhibits edema. L foot dorsum with significant cellulitis and edema   Neurological: He is alert and oriented to person, place, and time. No cranial nerve deficit. Skin: Skin is warm and dry. He is not diaphoretic. Slight warmth to L foot   Psychiatric: He has a normal mood and affect.  His behavior is normal. Judgment and thought content normal.         Diagnostic Study Results     Labs -  Recent Results (from the past 12 hour(s))   CULTURE, BLOOD    Collection Time: 04/17/18 12:55 PM   Result Value Ref Range    Special Requests: PERIPHERAL      Culture result: PENDING POC LACTIC ACID    Collection Time: 04/17/18 12:58 PM   Result Value Ref Range    Lactic Acid (POC) 1.1 0.4 - 2.0 mmol/L   CBC WITH AUTOMATED DIFF    Collection Time: 04/17/18  1:00 PM   Result Value Ref Range    WBC 3.7 (L) 4.6 - 13.2 K/uL    RBC 5.47 4.70 - 5.50 M/uL    HGB 15.8 13.0 - 16.0 g/dL    HCT 47.9 36.0 - 48.0 %    MCV 87.6 74.0 - 97.0 FL    MCH 28.9 24.0 - 34.0 PG    MCHC 33.0 31.0 - 37.0 g/dL    RDW 13.2 11.6 - 14.5 %    PLATELET 026 402 - 537 K/uL    MPV 10.0 9.2 - 11.8 FL    NEUTROPHILS 52 40 - 73 %    LYMPHOCYTES 33 21 - 52 %    MONOCYTES 7 3 - 10 %    EOSINOPHILS 7 (H) 0 - 5 %    BASOPHILS 1 0 - 2 %    ABS. NEUTROPHILS 1.9 1.8 - 8.0 K/UL    ABS. LYMPHOCYTES 1.2 0.9 - 3.6 K/UL    ABS. MONOCYTES 0.3 0.05 - 1.2 K/UL    ABS. EOSINOPHILS 0.3 0.0 - 0.4 K/UL    ABS. BASOPHILS 0.1 (H) 0.0 - 0.06 K/UL    DF AUTOMATED     METABOLIC PANEL, BASIC    Collection Time: 04/17/18  1:00 PM   Result Value Ref Range    Sodium 144 136 - 145 mmol/L    Potassium 4.2 3.5 - 5.5 mmol/L    Chloride 108 100 - 108 mmol/L    CO2 29 21 - 32 mmol/L    Anion gap 7 3.0 - 18 mmol/L    Glucose 110 (H) 74 - 99 mg/dL    BUN 12 7.0 - 18 MG/DL    Creatinine 1.21 0.6 - 1.3 MG/DL    BUN/Creatinine ratio 10 (L) 12 - 20      GFR est AA >60 >60 ml/min/1.73m2    GFR est non-AA >60 >60 ml/min/1.73m2    Calcium 8.7 8.5 - 10.1 MG/DL   SED RATE (ESR)    Collection Time: 04/17/18  1:00 PM   Result Value Ref Range    Sed rate, automated 3 0 - 15 mm/hr       Radiologic Studies -   XR FOOT LT MIN 3 V    (Results Pending)   XR Foot LT (Interpretation by ED Physician):  No acute foreign body, fracture or air. Medical Decision Making   I am the first provider for this patient. I reviewed the vital signs, available nursing notes, past medical history, past surgical history, family history and social history. Vital Signs-Reviewed the patient's vital signs. Pulse Oximetry Analysis -  100% on room air, stable.     Records Reviewed: Nursing Notes and Old Medical Records (Time of Review: 1:15 PM)    ED Course: Progress Notes, Reevaluation, and Consults:  Consult:  Discussed care with Dr. Caryl Alarcon, Podiatrist. Standard discussion; including history of patients chief complaint, available diagnostic results, and treatment course. All findings explained, okay with Levaquin as abx. Will see patient in consultation tomorrow morning. 1:46 PM, 4/17/2018     Consult:  Discussed care with Dr. Elner Nageotte, hospitalist. Standard discussion; including history of patients chief complaint, available diagnostic results, and treatment course. Accepts admit. 2:33 PM, 4/17/2018     2:40 PM: Dr. Elner Nageotte hospitalist wants to make sure there is no abscess in foot, will order CT foot. Provider Notes (Medical Decision Making): 44 y.o. male presents with L foot puncture wound, cellulitis and discharge, concern for pseudomonas infection. Will administer aggressive abx, XR to rule-out foreign body and admit to hospitalist.    For Hospitalized Patients:    1. Hospitalization Decision Time:  The decision to hospitalize the patient was made by Dr. Heron Bullard at 1:16 PM on 4/17/2018    2. Aspirin: Aspirin was not given because the patient did not present with a stroke at the time of their Emergency Department evaluation    Diagnosis     Clinical Impression:   1. Left foot infection    2. Left leg cellulitis        Disposition: Admit. Follow-up Information     None           Patient's Medications   Start Taking    No medications on file   Continue Taking    NIFEDIPINE ER (PROCARDIA XL) 30 MG ER TABLET    Take 1 Tab by mouth daily. These Medications have changed    No medications on file   Stop Taking    ALBUTEROL (PROVENTIL HFA, VENTOLIN HFA, PROAIR HFA) 90 MCG/ACTUATION INHALER    Take 2 puffs by inhalation every four (4) hours as needed for Wheezing.     ALBUTEROL (PROVENTIL HFA, VENTOLIN HFA, PROAIR HFA) 90 MCG/ACTUATION INHALER    Take 2 Puffs by inhalation every four (4) hours as needed for Wheezing. INHALATIONAL SPACING DEVICE    1 Each by Does Not Apply route as needed. _______________________________    Attestations:  Scribe Attestation     Magdaleno Doe acting as a scribe for and in the presence of Bernard Lopes MD      April 17, 2018 at 1:15 PM       Provider Attestation:      I personally performed the services described in the documentation, reviewed the documentation, as recorded by the scribe in my presence, and it accurately and completely records my words and actions.  April 17, 2018 at 1:15 PM - Bernard Lopes MD    _______________________________

## 2018-04-17 NOTE — IP AVS SNAPSHOT
[facility] 
 
 
 [address]  [facility] Patient: Cezar Martinez MRN: [id number] :1980 A check rea indicates which time of day the medication should be taken. My Medications START taking these medications Instructions Each Dose to Equal  
 Morning Noon Evening Bedtime  
 ciprofloxacin HCl 500 mg tablet Commonly known as:  CIPRO Your last dose was: Your next dose is: Take 1 Tab by mouth two (2) times a day for 5 days. 500 mg  
    
   
   
   
  
 clindamycin 300 mg capsule Commonly known as:  CLEOCIN Your last dose was: Your next dose is: Take 1 Cap by mouth every six (6) hours for 5 days. 300 mg HYDROcodone-acetaminophen 5-325 mg per tablet Commonly known as:  Tanisha Iron Your last dose was: Your next dose is: Take 1 Tab by mouth every six (6) hours as needed for up to 4 days. Max Daily Amount: 4 Tabs. 1 Tab  
    
   
   
   
  
 metoprolol tartrate 25 mg tablet Commonly known as:  LOPRESSOR Your last dose was: Your next dose is: Take 1 Tab by mouth two (2) times a day. 25 mg CONTINUE taking these medications Instructions Each Dose to Equal  
 Morning Noon Evening Bedtime  
 albuterol 90 mcg/actuation inhaler Commonly known as:  PROVENTIL HFA, VENTOLIN HFA, PROAIR HFA Your last dose was: Your next dose is: Take 2 Puffs by inhalation. 2 Puff NIFEdipine ER 30 mg ER tablet Commonly known as:  PROCARDIA XL Your last dose was: Your next dose is: Take 1 Tab by mouth daily. 30 mg Where to Get Your Medications Information on where to get these meds will be given to you by the nurse or doctor. ! Ask your nurse or doctor about these medications  
  ciprofloxacin HCl 500 mg tablet  
 clindamycin 300 mg capsule HYDROcodone-acetaminophen 5-325 mg per tablet  
 metoprolol tartrate 25 mg tablet

## 2018-04-17 NOTE — H&P
History and Physical    Patient: Jackson Wing               Sex: male          DOA: 4/17/2018       YOB: 1980      Age:  44 y.o.        LOS:  LOS: 0 days        HPI:     Jackson Wing is a 44 y.o. male who was admitted to the \[facility]\"" at [facility] as he stepped on a nail about two weeks ago . The pt was wearing shoes and the nail went through the shoe and intho the second toe on the left foot . The pt treated his foot symptomatically and eventually in part because of the smell he decided to come to the er . He was seen to have a swollen erythematous left foot with erythema spreading up [facility] to the calf. he will have a ct scan of the left foot . Bl;ood cultures will be done infectious disease and podiatry will see the patient . The pt is on disability for reasons he did not recall . His wbc was 3.7 . His  Creatinine was 1.21 x ray of the left foot was unremarkable . The pt has a h/o htn  But takes no medications . He has a h/o asthma and will use albuteral inhaler . He drinks one can of beer daily . Pt is afebrile       Past Medical History:   Diagnosis Date    Asthma     Hypertension        Social History:   Tobacco use:3 cigarettes daily    Alcohol use:one can of beer daily    Occupation:none     Family History:has two children one boy and one girl both in good health       Review of Systems    Constitutional:  No fever or weight loss  HEENT:  No headache or visual changes  Cardiovascular:  No chest pain or diaphoresis  Respiratory:  No coughing, wheezing, or shortness of breath. GI:  No nausea or vomitting. No diarrhea  :  No hematuria or dysuria  Skin:cellulitis on the left calf and left foot .    Neuro:  No seizures or syncope  Hematological:  No bruising or bleeding  Endocrine:  No diabetes or thyroid disease    Physical Exam:      Visit Vitals    BP (!) 161/106    Pulse 77    Temp 97.8 °F (36.6 °C)    Resp 18    Ht 6' 2\" (1.88 m)    Wt 132.9 kg (292 lb 14.4 oz)    SpO2 100%  BMI 37.61 kg/m2       Physical Exam:    Gen:  No distress, alert  HEENT:  Normal cephalic atraumatic, extra-occular movements are intact. Neck:  Supple, No JVD  Lungs:  Clear bilaterally, no wheeze, no rales,  Heart:  Regular Rate and Rhythm, normal S1 and S2,   Abdomen:  Soft, non tender, normal bowel sounds, no guarding. Extremities:  Left foot and left calf are erythematous and swollen . Foot is tender to palpation   Neurological:  Awake and alert, CN's are intact, normal strength throughout extremities  Skin:erythema of the left calf and left foot   Mental Status:  Normal thought process,   Laboratory Studies:    BMP:   Lab Results   Component Value Date/Time     04/17/2018 01:00 PM    K 4.2 04/17/2018 01:00 PM     04/17/2018 01:00 PM    CO2 29 04/17/2018 01:00 PM    AGAP 7 04/17/2018 01:00 PM     (H) 04/17/2018 01:00 PM    BUN 12 04/17/2018 01:00 PM    CREA 1.21 04/17/2018 01:00 PM    GFRAA >60 04/17/2018 01:00 PM    GFRNA >60 04/17/2018 01:00 PM     CMP:   Lab Results   Component Value Date/Time     04/17/2018 01:00 PM    K 4.2 04/17/2018 01:00 PM     04/17/2018 01:00 PM    CO2 29 04/17/2018 01:00 PM    AGAP 7 04/17/2018 01:00 PM     (H) 04/17/2018 01:00 PM    BUN 12 04/17/2018 01:00 PM    CREA 1.21 04/17/2018 01:00 PM    GFRAA >60 04/17/2018 01:00 PM    GFRNA >60 04/17/2018 01:00 PM    CA 8.7 04/17/2018 01:00 PM     CBC:   Lab Results   Component Value Date/Time    WBC 3.7 (L) 04/17/2018 01:00 PM    HGB 15.8 04/17/2018 01:00 PM    HCT 47.9 04/17/2018 01:00 PM     04/17/2018 01:00 PM       Assessment/Plan     1 s/p penetrating wound in the left foot when the pt stepped on a nail . It went through his shoe and intov the foot at the level of the second toe. 2 htn   3 asthma      Plan will ask id and podiatry to see the pt . A ct scan of the left foot will be done .

## 2018-04-17 NOTE — IP AVS SNAPSHOT
[address] Patient: Delta Fontaine MRN: [id number] :[id number] About your hospitalization You were admitted on:  2018 You last received care in the:  [address] [place] [address],[address] You were discharged on:  2018 Why you were hospitalized Your primary diagnosis was:  Cellulitis Follow-up Information Follow up With Details Comments Contact [facility] EMERGENCY DEPT  If symptoms worsen [address] 
[phone] Please follow-up with your primary care doctor or [facility] if you do not have a PCP. None   None ([phone]) Patient stated that they have no PCP 
  
 Kan Sparrow DPM  2018 [address] [facility] [address] [facility] [place] [address] [facility] [id number] [phone] Discharge Orders None A check rea indicates which time of day the medication should be taken. My Medications START taking these medications Instructions Each Dose to Equal  
 Morning Noon Evening Bedtime  
 ciprofloxacin HCl 500 mg tablet Commonly known as:  CIPRO Your last dose was: Your next dose is: Take 1 Tab by mouth two (2) times a day for 5 days. 500 mg  
    
   
   
   
  
 clindamycin 300 mg capsule Commonly known as:  CLEOCIN Your last dose was: Your next dose is: Take 1 Cap by mouth every six (6) hours for 5 days. 300 mg HYDROcodone-acetaminophen 5-325 mg per tablet Commonly known as:  [place] Your last dose was: Your next dose is: Take 1 Tab by mouth every six (6) hours as needed for up to 4 days. Max Daily Amount: 4 Tabs. 1 Tab  
    
   
   
   
  
 metoprolol tartrate 25 mg tablet Commonly known as:  LOPRESSOR Your last dose was: Your next dose is: Take 1 Tab by mouth two (2) times a day. 25 mg CONTINUE taking these medications Instructions Each Dose to Equal  
 Morning Noon Evening Bedtime  
 albuterol 90 mcg/actuation inhaler Commonly known as:  PROVENTIL HFA, VENTOLIN HFA, PROAIR HFA Your last dose was: Your next dose is: Take 2 Puffs by inhalation. 2 Puff NIFEdipine ER 30 mg ER tablet Commonly known as:  PROCARDIA XL Your last dose was: Your next dose is: Take 1 Tab by mouth daily. 30 mg Where to Get Your Medications Information on where to get these meds will be given to you by the nurse or doctor. ! Ask your nurse or doctor about these medications  
  ciprofloxacin HCl 500 mg tablet  
 clindamycin 300 mg capsule HYDROcodone-acetaminophen 5-325 mg per tablet  
 metoprolol tartrate 25 mg tablet Opioid Education Prescription Opioids: What You Need to Know: 
 
Prescription opioids can be used to help relieve moderate-to-severe pain and are often prescribed following a surgery or injury, or for certain health conditions. These medications can be an important part of treatment but also come with serious risks. Opioids are strong pain medicines. Examples include hydrocodone, oxycodone, fentanyl, and morphine. Heroin is an example of an illegal opioid. It is important to work with your health care provider to make sure you are getting the safest, most effective care. WHAT ARE THE RISKS AND SIDE EFFECTS OF OPIOID USE? Prescription opioids carry serious risks of addiction and overdose, especially with prolonged use. An opioid overdose, often marked by slow breathing, can cause sudden death. The use of prescription opioids can have a number of side effects as well, even when taken as directed.  
  
· Tolerance-meaning you might need to take more of a medication for the same pain relief · Physical dependence-meaning you have symptoms of withdrawal when the medication is stopped. Withdrawal symptoms can include nausea, sweating, chills, diarrhea, stomach cramps, and muscle aches. Withdrawal can last up to several weeks, depending on which drug you took and how long you took it. · Increased sensitivity to pain · Constipation · Nausea, vomiting, and dry mouth · Sleepiness and dizziness · Confusion · Depression · Low levels of testosterone that can result in lower sex drive, energy, and strength · Itching and sweating RISKS ARE GREATER WITH:      
· History of drug misuse, substance use disorder, or overdose · Mental health conditions (such as depression or anxiety) · Sleep apnea · Older age (72 years or older) · Pregnancy Avoid alcohol while taking prescription opioids. Also, unless specifically advised by your health care provider, medications to avoid include: · Benzodiazepines (such as Xanax or Valium) · Muscle relaxants (such as Soma or Flexeril) · Hypnotics (such as Ambien or Lunesta) · Other prescription opioids KNOW YOUR OPTIONS Talk to your health care provider about ways to manage your pain that don't involve prescription opioids. Some of these options may actually work better and have fewer risks and side effects. Options may include: 
· Pain relievers such as acetaminophen, ibuprofen, and naproxen · Some medications that are also used for depression or seizures · Physical therapy and exercise · Counseling to help patients learn how to cope better with triggers of pain and stress. · Application of heat or cold compress · Massage therapy · Relaxation techniques Be Informed Make sure you know the name of your medication, how much and how often to take it, and its potential risks & side effects. IF YOU ARE PRESCRIBED OPIOIDS FOR PAIN: 
· Never take opioids in greater amounts or more often than prescribed. Remember the goal is not to be pain-free but to manage your pain at a tolerable level. · Follow up with your primary care provider to: · Work together to create a plan on how to manage your pain. · Talk about ways to help manage your pain that don't involve prescription opioids. · Talk about any and all concerns and side effects. · Help prevent misuse and abuse. · Never sell or share prescription opioids · Help prevent misuse and abuse. · Store prescription opioids in a secure place and out of reach of others (this may include visitors, children, friends, and family). · Safely dispose of unused/unwanted prescription opioids: Find your community drug take-back program or your pharmacy mail-back program, or flush them down the toilet, following guidance from the Food and Drug Administration (www.fda.gov/Drugs/ResourcesForYou). · Visit www.cdc.gov/drugoverdose to learn about the risks of opioid abuse and overdose. · If you believe you may be struggling with addiction, tell your health care provider and ask for guidance or call [address] [place], [place] [address] at [phone]. Discharge Instructions DISCHARGE SUMMARY from Nurse PATIENT INSTRUCTIONS: 
 
 
F-face looks uneven A-arms unable to move or move unevenly S-speech slurred or non-existent T-time-call 911 as soon as signs and symptoms begin-DO NOT go Back to bed or wait to see if you get better-TIME IS BRAIN. Warning Signs of HEART ATTACK Call 911 if you have these symptoms: 
? Chest discomfort. Most heart attacks involve discomfort in the center of the chest that lasts more than a few minutes, or that goes away and comes back. It can feel like uncomfortable pressure, squeezing, fullness, or pain. ? Discomfort in other areas of the upper body. Symptoms can include pain or discomfort in one or both arms, the back, neck, jaw, or stomach. ? Shortness of breath with or without chest discomfort. ? Other signs may include breaking out in a cold sweat, nausea, or lightheadedness. Don't wait more than five minutes to call [address]! Fast action can save your life. Calling 911 is almost always the fastest way to get lifesaving treatment. Emergency Medical Services staff can begin treatment when they arrive  up to an hour sooner than if someone gets to the hospital by car. The discharge information has been reviewed with the patient. The patient verbalized understanding. Discharge medications reviewed with the patient and appropriate educational materials and side effects teaching were provided. ___________________________________________________________________________________________________________________________________ Introducing \[facility]\"" & HEALTH SERVICES! Ken Phan introduces PicApp patient portal. Now you can access parts of your medical record, email your doctor's office, and request medication refills online. 1. In your internet browser, go to [email or web address] 2. Click on the First Time User? Click Here link in the Sign In box. You will see the New Member Sign Up page. 3. Enter your PicApp Access Code exactly as it appears below. You will not need to use this code after youve completed the sign-up process. If you do not sign up before the expiration date, you must request a new code. · PicApp Access Code: [facility] Expires: 7/16/2018 12:26 PM 
 
4. Enter the last four digits of your Social Security Number (xxxx) and Date of Birth (mm/dd/yyyy) as indicated and click Submit. You will be taken to the next sign-up page. 5. Create a MyChart ID.  This will be your [employer] login ID and cannot be changed, so think of one that is secure and easy to remember. 6. Create a [employer]osta password. You can change your password at any time. 7. Enter your Password Reset Question and Answer. This can be used at a later time if you forget your password. 8. Enter your e-mail address. You will receive e-mail notification when new information is available in [address] 9. Click Sign Up. You can now view and download portions of your medical record. 10. Click the Download Summary menu link to download a portable copy of your medical information. If you have questions, please visit the Frequently Asked Questions section of the Autocosta website. Remember, [employer]ocosta is NOT to be used for urgent needs. For medical emergencies, dial 911. Now available from your iPhone and Android! Introducing Hunter Escobedo As a [employer] patient, I wanted to make you aware of our electronic visit tool called Hunter Escobedo. [employer] 24/7 allows you to connect within minutes with a medical provider 24 hours a day, seven days a week via a mobile device or tablet or logging into a secure website from your computer. You can access Hunter Escobedo from anywhere in the [place]. A virtual visit might be right for you when you have a simple condition and feel like you just dont want to get out of bed, or cant get away from work for an appointment, when your regular [employer] provider is not available (evenings, weekends or holidays), or when youre out of town and need minor care. Electronic visits cost only $49 and if the [employer] 24/7 provider determines a prescription is needed to treat your condition, one can be electronically transmitted to a nearby pharmacy*. Please take a moment to enroll today if you have not already done so. The enrollment process is free and takes just a few minutes.   To enroll, please download the [employer] 24/7 dominick to your tablet or phone, or visit [email or web address] org to enroll on your computer. And, as an [address] [place] [address] patient with a [employer] account, the results of your visits will be scanned into your electronic medical record and your primary care provider will be able to view the scanned results. We urge you to continue to see your regular [facility] provider for your ongoing medical care. And while your primary care provider may not be the one available when you seek a [place] virtual visit, the peace of mind you get from getting a real diagnosis real time can be priceless. For more information on [place], view our Frequently Asked Questions (FAQs) at [email or web address] org. Sincerely, 
 
Marcela Zaldivar MD 
Chief Medical Officer [facility] Raysa Saucedo *:  certain medications cannot be prescribed via [place] Unresulted Labs-Please follow up with your PCP about these lab tests Order Current Status CULTURE, BLOOD Preliminary result CULTURE, BLOOD Preliminary result Providers Seen During Your Hospitalization Provider Specialty Primary office phone Meron Arguello DO Emergency Medicine [phone] Olesya Mandujano MD Emergency Medicine [phone] Shraddha Hernandez MD Internal Medicine [phone] Delwin Lennox, DO Internal Medicine [phone] Immunizations Administered for This Admission Name Date Tdap 4/17/2018 Your Primary Care Physician (PCP) Primary Care Physician Office Phone Office Fax NONE ** None ** ** None ** You are allergic to the following No active allergies Recent Documentation Height Weight BMI Smoking Status 1.88 m 132.9 kg 37.61 kg/m2 Current Some Day Smoker Emergency Contacts Name Discharge Info Relation Home Work Mobile VasquezSarah  Other Relative [6] [phone] Patient Belongings The following personal items are in your possession at time of discharge: 
  Dental Appliances: None  Visual Aid: Glasses      Home Medications: None   Jewelry: None  Clothing: Pants, Shirt, Footwear, Socks, Undergarments, Jacket/Coat, [employer]    Other Valuables: [employer] Discharge Instructions Attachments/References CELLULITIS (ENGLISH) Patient Handouts Cellulitis: Care Instructions Your Care Instructions Cellulitis is a skin infection. It often occurs after a break in the skin from a scrape, cut, bite, or puncture, or after a rash. The doctor has checked you carefully, but problems can develop later. If you notice any problems or new symptoms, get medical treatment right away. Follow-up care is a key part of your treatment and safety. Be sure to make and go to all appointments, and call your doctor if you are having problems. It's also a good idea to know your test results and keep a list of the medicines you take. How can you care for yourself at home? · Take your antibiotics as directed. Do not stop taking them just because you feel better. You need to take the full course of antibiotics. · Prop up the infected area on pillows to reduce pain and swelling. Try to keep the area above the level of your heart as often as you can. · If your doctor told you how to care for your wound, follow your doctor's instructions. If you did not get instructions, follow this general advice: ¨ Wash the wound with clean water 2 times a day. Don't use hydrogen peroxide or alcohol, which can slow healing. ¨ You may cover the wound with a thin layer of petroleum jelly, such as Vaseline, and a nonstick bandage. ¨ Apply more petroleum jelly and replace the bandage as needed. · Be safe with medicines. Take pain medicines exactly as directed. ¨ If the doctor gave you a prescription medicine for pain, take it as prescribed. ¨ If you are not taking a prescription pain medicine, ask your doctor if you can take an over-the-counter medicine. To prevent cellulitis in the future · Try to prevent cuts, scrapes, or other injuries to your skin. Cellulitis most often occurs where there is a break in the skin. · If you get a scrape, cut, mild burn, or bite, wash the wound with clean water as soon as you can to help avoid infection. Don't use hydrogen peroxide or alcohol, which can slow healing. · If you have swelling in your legs (edema), support stockings and good skin care may help prevent leg sores and cellulitis. · Take care of your feet, especially if you have diabetes or other conditions that increase the risk of infection. Wear shoes and socks. Do not go barefoot. If you have athlete's foot or other skin problems on your feet, talk to your doctor about how to treat them. When should you call for help? Call your doctor now or seek immediate medical care if: 
? · You have signs that your infection is getting worse, such as: 
¨ Increased pain, swelling, warmth, or redness. ¨ Red streaks leading from the area. ¨ Pus draining from the area. ¨ A fever. ? · You get a rash. ? Watch closely for changes in your health, and be sure to contact your doctor if: 
? · You are not getting better after 1 day (24 hours). ? · You do not get better as expected. Where can you learn more? Go to http://bianca-arturo.info/. Gilberto Hernandez in the search box to learn more about \"Cellulitis: Care Instructions. \" Current as of: October 13, 2016 Content Version: 11.4 © 3021-4383 [employer]. Care instructions adapted under license by [employer] (which disclaims liability or warranty for this information). If you have questions about a medical condition or this instruction, always ask your healthcare professional. [address] any warranty or liability for your use of this information. Please provide this summary of care documentation to your next provider. Signatures-by signing, you are acknowledging that this After Visit Summary has been reviewed with you and you have received a copy. Patient Signature:  ____________________________________________________________ Date:  ____________________________________________________________  
  
Rexann Ports Provider Signature:  ____________________________________________________________ Date:  ____________________________________________________________

## 2018-04-17 NOTE — ROUTINE PROCESS
Bedside and Verbal shift change report given to Ajit   (oncoming nurse) by Yonatan Jaffe RN   (offgoing nurse). Report included the following information SBAR, Kardex and Intake/Output.

## 2018-04-17 NOTE — PROGRESS NOTES
Pharmacy Dosing Services: Vancomycin    Indication: Skin and Soft Tissue Infection    Day of therapy: 0 of 7 days    Other Antimicrobials (Include dose, start day & day of therapy):  Clindamycin 300 mg every 6 hours, 2018  Piperacillin-Tazobactam 3.375 grams every 6 hours, 2018    Loading dose (date given): 2,000 mg  Current Maintenance dose: New Start    Goal Vancomycin Level: 15-20 mcg/mL  (Trough 15-20 for most infections, 20 for meningitis/osteomyelitis, pre-HD level ~25)    Vancomycin Level (if drawn): N/A     Significant Cultures: blood culture taken    Renal function stable? (unstable defined as SCr increase of 0.5 mg/dL or > 50% increase from baseline, whichever is greater) (Y/N): Y     CAPD, Hemodialysis or Renal Replacement Therapy (Y/N): N     Recent Labs      18   1300   CREA  1.21   BUN  12   WBC  3.7*     Temp (24hrs), Av.8 °F (36.6 °C), Min:97 °F (36.1 °C), Max:98.6 °F (37 °C)    Creatinine Clearance (Creatinine Clearance (ml/min)): 120 mL/min     Regimen assessment: New start Skin and Soft Tissue infection  Maintenance dose: 1,500 mg every 8 hours  Next scheduled level: 2018 at . Phi Barreto will follow daily and adjust medications as appropriate for renal function and/or serum levels.     Thank you,  Jayme Vang, PHARMD

## 2018-04-17 NOTE — CONSULTS
Infectious Disease Consultation Note    Requested by: Dr. Juanjo Lentz    Reason: Left foot infection    Current abx Prior abx   Zosyn, Vancomycin, clindamycin 4/17 - 0 Ceftriaxone 4/17 - 0      ASSESSMENT - > REC:     Left foot cellulitis  - initially had nail puncture through sneaker sole 2 weeks PTA but recently worsened while self treating with Epsom salt soaks  - has scalded skin appearance on dorsum of foot -> continue Zosyn  -> add Vancomycin IV and Clindamycin po  -> one more blood culture   Asthma    HTN      MICROBIOLOGY:   4/17 blcx x 1 IP    LINES AND CATHETERS:   piv      HPI:    44year-old [facility] American Male with h/o asthma, HTN - on no meds admitted with left foot infection. He suffered a puncture wound through a sneaker around 2 weeks PTA and immediately began to tend to it with Epsom salt soaks. Around 2-3 days later he developed swelling of the left foot but still walked around using boots. He later noted redness of the foot and increasing pain and today came to the ED where the erythema was seen on the dorsum of his foot. CT scan of the left foot showed mostly dorsal left foot cellulitis with linear inflammatory stranding in the plantar soft tissues but no abscess, necrotizing fascitis,septic arthritis or osteomyelitis. He was admitted and given a dose of Ceftriaxone and levofloxacin and has now been started on Zosyn. Past Medical History:   Diagnosis Date    Asthma     Hypertension        History reviewed. No pertinent surgical history. Allergies: Review of patient's allergies indicates no known allergies.  .    Current Facility-Administered Medications   Medication Dose Route Frequency    sodium chloride 0.9 % bolus infusion 1,000 mL  1,000 mL IntraVENous ONCE    [START ON 4/18/2018] NIFEdipine ER (PROCARDIA XL) tablet 30 mg  30 mg Oral DAILY    0.9% sodium chloride infusion  50 mL/hr IntraVENous CONTINUOUS    acetaminophen (TYLENOL) tablet 650 mg  650 mg Oral Q4H PRN    ondansetron (ZOFRAN) injection 4 mg  4 mg IntraVENous Q4H PRN    enoxaparin (LOVENOX) injection 40 mg  40 mg SubCUTAneous Q24H    piperacillin-tazobactam (ZOSYN) 3.375 g in 0.9% sodium chloride (MBP/ADV) 100 mL MBP  3.375 g IntraVENous Q6H       History reviewed. No pertinent family history. Social History     Social History    Marital status: SINGLE     Spouse name: N/A    Number of children: N/A    Years of education: N/A     Occupational History    Not on file. Social History Main Topics    Smoking status: Current Some Day Smoker    Smokeless tobacco: Never Used    Alcohol use No    Drug use: Not on file    Sexual activity: Not on file     Other Topics Concern    Not on file     Social History Narrative     History   Smoking Status    Current Some Day Smoker   Smokeless Tobacco    Never Used        Temp (24hrs), Av.2 °F (36.8 °C), Min:97.8 °F (36.6 °C), Max:98.6 °F (37 °C)    Visit Vitals    /75    Pulse 75    Temp 98.6 °F (37 °C)    Resp 18    Ht 6' 2\" (1.88 m)    Wt 132.9 kg (292 lb 14.4 oz)    SpO2 100%    BMI 37.61 kg/m2       ROS:A comprehensive review of systems was negative except for that written in the History of Present Illness. Physical Exam:    General: Well developed, well nourished 44 y.o.  male in no acute distress. ENT: ENT exam normal, no neck nodes or sinus tenderness  Head: normocephalic, without obvious abnormality  Mouth:  mucous membranes moist, pharynx normal without lesions  Neck: supple, symmetrical, trachea midline   Cardio:  regular rate and rhythm, S1, S2 normal, no murmur, click, rub or gallop  Chest: inspection normal - no chest wall deformities or tenderness, respiratory effort normal  Lungs: clear to auscultation, no wheezes or rales and unlabored breathing  Abdomen: soft, non-tender. Bowel sounds normal. No masses, no organomegaly.   Extremities:  no redness or tenderness in the calves or thighs, left foot edematous with shiny bright red erythema of the dorsum of the left foot, including the toes extending to the midfoot with scaly, hyperkeratotic skin from midfoot to ankle  Neuro: Grossly normal      Labs: Results:   Chemistry Recent Labs      04/17/18   1300   GLU  110*   NA  144   K  4.2   CL  108   CO2  29   BUN  12   CREA  1.21   CA  8.7   AGAP  7   BUCR  10*      CBC w/Diff Recent Labs      04/17/18   1300   WBC  3.7*   RBC  5.47   HGB  15.8   HCT  47.9   PLT  192   GRANS  52   LYMPH  33   EOS  7*      Microbiology Recent Labs      04/17/18   1255   CULT  PENDING        Dora Ferrer M.D.  [facility]   [phone]

## 2018-04-17 NOTE — Clinical Note
Status[de-identified] Inpatient [101] Type of Bed: Medical [8] Inpatient Hospitalization Certified Necessary for the Following Reasons: 1. Patient Failed outpatient treatment (further clarification in H&P documentation) Admitting Diagnosis: Cellulitis [[id number]] Admitting Physician: Derek Cruz [[id number]] Attending Physician: Derek Cruz [[id number]] Estimated Length of Stay: 3-4 Midnights Discharge Plan[de-identified] Home with Office Follow-up

## 2018-04-17 NOTE — PROGRESS NOTES
1610 received pt from ER, alert and oriented, bedrest instructed, came with swollen left foot with redness, old rashes around looks wet, no drainage noted at this time but foul smell  Noted in the lession. Second toe  Is very darl, feeling sensation. Dr Lizeth Akhtar paged if needed prn pain med and wound consult. 1900  No pain at this time, voiding, moving left leg.

## 2018-04-18 LAB
ALBUMIN SERPL-MCNC: 3.3 G/DL (ref 3.4–5)
ALBUMIN/GLOB SERPL: 0.9 {RATIO} (ref 0.8–1.7)
ALP SERPL-CCNC: 51 U/L (ref 45–117)
ALT SERPL-CCNC: 27 U/L (ref 16–61)
ANION GAP SERPL CALC-SCNC: 8 MMOL/L (ref 3–18)
AST SERPL-CCNC: 28 U/L (ref 15–37)
BASOPHILS # BLD: 0.1 K/UL (ref 0–0.06)
BASOPHILS NFR BLD: 1 % (ref 0–2)
BILIRUB SERPL-MCNC: 0.4 MG/DL (ref 0.2–1)
BUN SERPL-MCNC: 13 MG/DL (ref 7–18)
BUN/CREAT SERPL: 11 (ref 12–20)
CALCIUM SERPL-MCNC: 8.6 MG/DL (ref 8.5–10.1)
CHLORIDE SERPL-SCNC: 107 MMOL/L (ref 100–108)
CO2 SERPL-SCNC: 27 MMOL/L (ref 21–32)
CREAT SERPL-MCNC: 1.23 MG/DL (ref 0.6–1.3)
DIFFERENTIAL METHOD BLD: ABNORMAL
EOSINOPHIL # BLD: 0.3 K/UL (ref 0–0.4)
EOSINOPHIL NFR BLD: 7 % (ref 0–5)
ERYTHROCYTE [DISTWIDTH] IN BLOOD BY AUTOMATED COUNT: 13.2 % (ref 11.6–14.5)
GLOBULIN SER CALC-MCNC: 3.7 G/DL (ref 2–4)
GLUCOSE SERPL-MCNC: 78 MG/DL (ref 74–99)
HCT VFR BLD AUTO: 44.6 % (ref 36–48)
HGB BLD-MCNC: 14.6 G/DL (ref 13–16)
LYMPHOCYTES # BLD: 1.7 K/UL (ref 0.9–3.6)
LYMPHOCYTES NFR BLD: 38 % (ref 21–52)
MCH RBC QN AUTO: 28.5 PG (ref 24–34)
MCHC RBC AUTO-ENTMCNC: 32.7 G/DL (ref 31–37)
MCV RBC AUTO: 87.1 FL (ref 74–97)
MONOCYTES # BLD: 0.5 K/UL (ref 0.05–1.2)
MONOCYTES NFR BLD: 11 % (ref 3–10)
NEUTS SEG # BLD: 2 K/UL (ref 1.8–8)
NEUTS SEG NFR BLD: 43 % (ref 40–73)
PLATELET # BLD AUTO: 199 K/UL (ref 135–420)
PMV BLD AUTO: 10.7 FL (ref 9.2–11.8)
POTASSIUM SERPL-SCNC: 4.2 MMOL/L (ref 3.5–5.5)
PROT SERPL-MCNC: 7 G/DL (ref 6.4–8.2)
RBC # BLD AUTO: 5.12 M/UL (ref 4.7–5.5)
SODIUM SERPL-SCNC: 142 MMOL/L (ref 136–145)
WBC # BLD AUTO: 4.6 K/UL (ref 4.6–13.2)

## 2018-04-18 PROCEDURE — 74011000258 HC RX REV CODE- 258: Performed by: INTERNAL MEDICINE

## 2018-04-18 PROCEDURE — 36415 COLL VENOUS BLD VENIPUNCTURE: CPT | Performed by: HOSPITALIST

## 2018-04-18 PROCEDURE — 74011000258 HC RX REV CODE- 258: Performed by: HOSPITALIST

## 2018-04-18 PROCEDURE — 74011250637 HC RX REV CODE- 250/637: Performed by: HOSPITALIST

## 2018-04-18 PROCEDURE — 74011250636 HC RX REV CODE- 250/636: Performed by: HOSPITALIST

## 2018-04-18 PROCEDURE — 74011250636 HC RX REV CODE- 250/636: Performed by: INTERNAL MEDICINE

## 2018-04-18 PROCEDURE — 65270000029 HC RM PRIVATE

## 2018-04-18 PROCEDURE — 77030020263 HC SOL INJ SOD CL0.9% LFCR 1000ML

## 2018-04-18 PROCEDURE — 74011250637 HC RX REV CODE- 250/637: Performed by: INTERNAL MEDICINE

## 2018-04-18 PROCEDURE — 80053 COMPREHEN METABOLIC PANEL: CPT | Performed by: HOSPITALIST

## 2018-04-18 PROCEDURE — 85025 COMPLETE CBC W/AUTO DIFF WBC: CPT | Performed by: HOSPITALIST

## 2018-04-18 PROCEDURE — 77030011255 HC DSG AQUACEL AG BMS -A

## 2018-04-18 RX ORDER — METOPROLOL TARTRATE 25 MG/1
25 TABLET, FILM COATED ORAL 2 TIMES DAILY
Status: DISCONTINUED | OUTPATIENT
Start: 2018-04-18 | End: 2018-04-19 | Stop reason: HOSPADM

## 2018-04-18 RX ORDER — NIFEDIPINE 30 MG/1
30 TABLET, EXTENDED RELEASE ORAL DAILY
Status: DISCONTINUED | OUTPATIENT
Start: 2018-04-18 | End: 2018-04-19 | Stop reason: HOSPADM

## 2018-04-18 RX ADMIN — METOPROLOL TARTRATE 25 MG: 25 TABLET ORAL at 14:30

## 2018-04-18 RX ADMIN — PIPERACILLIN SODIUM,TAZOBACTAM SODIUM 3.38 G: 3; .375 INJECTION, POWDER, FOR SOLUTION INTRAVENOUS at 02:45

## 2018-04-18 RX ADMIN — SODIUM CHLORIDE 1500 MG: 900 INJECTION, SOLUTION INTRAVENOUS at 11:30

## 2018-04-18 RX ADMIN — PIPERACILLIN SODIUM,TAZOBACTAM SODIUM 3.38 G: 3; .375 INJECTION, POWDER, FOR SOLUTION INTRAVENOUS at 09:40

## 2018-04-18 RX ADMIN — SODIUM CHLORIDE 1500 MG: 900 INJECTION, SOLUTION INTRAVENOUS at 02:45

## 2018-04-18 RX ADMIN — ENOXAPARIN SODIUM 40 MG: 40 INJECTION SUBCUTANEOUS at 16:51

## 2018-04-18 RX ADMIN — SODIUM CHLORIDE 50 ML/HR: 900 INJECTION, SOLUTION INTRAVENOUS at 17:51

## 2018-04-18 RX ADMIN — NIFEDIPINE 30 MG: 30 TABLET, FILM COATED, EXTENDED RELEASE ORAL at 09:40

## 2018-04-18 RX ADMIN — CLINDAMYCIN HYDROCHLORIDE 300 MG: 150 CAPSULE ORAL at 12:14

## 2018-04-18 RX ADMIN — SODIUM CHLORIDE 1500 MG: 900 INJECTION, SOLUTION INTRAVENOUS at 20:21

## 2018-04-18 RX ADMIN — CLINDAMYCIN HYDROCHLORIDE 300 MG: 150 CAPSULE ORAL at 05:32

## 2018-04-18 RX ADMIN — CLINDAMYCIN HYDROCHLORIDE 300 MG: 150 CAPSULE ORAL at 23:50

## 2018-04-18 RX ADMIN — PIPERACILLIN SODIUM,TAZOBACTAM SODIUM 3.38 G: 3; .375 INJECTION, POWDER, FOR SOLUTION INTRAVENOUS at 16:55

## 2018-04-18 RX ADMIN — CLINDAMYCIN HYDROCHLORIDE 300 MG: 150 CAPSULE ORAL at 00:05

## 2018-04-18 RX ADMIN — PIPERACILLIN SODIUM,TAZOBACTAM SODIUM 3.38 G: 3; .375 INJECTION, POWDER, FOR SOLUTION INTRAVENOUS at 22:49

## 2018-04-18 RX ADMIN — CLINDAMYCIN HYDROCHLORIDE 300 MG: 150 CAPSULE ORAL at 17:00

## 2018-04-18 NOTE — WOUND CARE
Received IP wound care consult and reviewed EMR. Patient currently being followed by podiatry with wound care orders in place.

## 2018-04-18 NOTE — PROGRESS NOTES
Problem: Falls - Risk of  Goal: *Absence of Falls  Document Robin Fall Risk and appropriate interventions in the flowsheet. Outcome: Progressing Towards Goal  Fall Risk Interventions:  Mobility Interventions: Communicate number of staff needed for ambulation/transfer, Patient to call before getting OOB    Medication Interventions: Patient to call before getting OOB, Teach patient to arise slowly    Elimination Interventions: Call light in reach, Patient to call for help with toileting needs, Toileting schedule/hourly rounds, Toilet paper/wipes in reach, Urinal in reach    Problem: General Infection Care Plan (Adult and Pediatric)  Goal: Improvement in signs and symptoms of infection  Outcome: Not Progressing Towards Goal  Pt receiving multiple antibiotics.

## 2018-04-18 NOTE — PROGRESS NOTES
Problem: Hypertension  Goal: *Blood pressure within specified parameters  Outcome: Not Progressing Towards Goal  Pt restarted on Procardia. Pt prescribed this medication on previous admission, but reports he did not understand that he was to continue this med after the prescription ran out. Pt reports he has no PCP.   Pt educated on meds and hypertension and provided educational handouts

## 2018-04-18 NOTE — PROGRESS NOTES
Care Management Interventions  PCP Verified by CM: Yes  Palliative Care Criteria Met (RRAT>21 & CHF Dx)?: No  Mode of Transport at Discharge: Self  Transition of Care Consult (CM Consult): Discharge Planning  Discharge Durable Medical Equipment: No  Physical Therapy Consult: No  Occupational Therapy Consult: No  Speech Therapy Consult: No  Current Support Network: Other (friend)  Confirm Follow Up Transport: Family  Plan discussed with Pt/Family/Caregiver: Yes  Discharge Location  Discharge Placement: Home     Spoke with patient in room, He stated that he lives with his friend but upon discharge he will go to another friends home @ [address]. He is independent with ADL's and has no DME's at this time. He stated that he has no PCP (  consult ordered) and has Medicaid. Patient has designated ______aunt and sister__________________ to participate in his/her discharge plan and to receive any needed information. [facility] chencho [phone] and sister Ruddy Gruber [phone]. Reason for Admission:    Left foot infection                  RRAT Score:   4               Do you (patient/family) have any concerns for transition/discharge?     no               Plan for utilizing home health:     Possibly hh pending coarse of tx    Likelihood of readmission?   moderate          Transition of Care Plan:      Home / hh

## 2018-04-18 NOTE — PROGRESS NOTES
Pt. Bp has been high all morning, the nurse was told at 8am after  first bp/check Meds was giving by LOW Cohen,  BP was repeated again at 1pm and was still high, RN Beryle Bugler was told and doctor was called.

## 2018-04-18 NOTE — PROGRESS NOTES
Infectious Disease Follow-up     Admit Date: 4/17/2018    Current abx Prior abx   Zosyn, Vancomycin, clindamycin 4/17 - 0 Ceftriaxone 4/17 - 0       ASSESSMENT - > REC:      Left foot cellulitis  - initially had nail puncture through sneaker sole 2 weeks PTA but recently worsened while self treating with Epsom salt soaks  - has scalded skin appearance on dorsum of foot -> continue Zosyn,Vancomycin IV and Clindamycin po  -> if further improved tomorrow will de-escalate to po Cipro and Clindamycin for 5 more days   Asthma     HTN        MICROBIOLOGY:   4/17               blcx x 1 IP     LINES AND CATHETERS:   piv        Active Hospital Problems    Diagnosis Date Noted    Cellulitis 04/17/2018         Subjective: Interval notes reviewed. Seen by Dr. David Dailey and dressing placed on left foot.   Feels better - no fever    Current Facility-Administered Medications   Medication Dose Route Frequency    NIFEdipine ER (PROCARDIA XL) tablet 30 mg  30 mg Oral DAILY    piperacillin-tazobactam (ZOSYN) 3.375 g in 0.9% sodium chloride (MBP/ADV) 100 mL MBP  #EXTENDED 4 HOUR INFUSION###  3.375 g IntraVENous Q8H    metoprolol tartrate (LOPRESSOR) tablet 25 mg  25 mg Oral BID    0.9% sodium chloride infusion  50 mL/hr IntraVENous CONTINUOUS    acetaminophen (TYLENOL) tablet 650 mg  650 mg Oral Q4H PRN    ondansetron (ZOFRAN) injection 4 mg  4 mg IntraVENous Q4H PRN    enoxaparin (LOVENOX) injection 40 mg  40 mg SubCUTAneous Q24H    clindamycin (CLEOCIN) capsule 300 mg  300 mg Oral Q6H    VANCOMYCIN INFORMATION NOTE   Other Rx Dosing/Monitoring    vancomycin (VANCOCIN) 1,500 mg in 0.9% sodium chloride 500 mL IVPB  1,500 mg IntraVENous Q8H    HYDROcodone-acetaminophen (NORCO) 5-325 mg per tablet 1 Tab  1 Tab Oral Q6H PRN    [START ON 4/19/2018] VANCOMYCIN INFORMATION NOTE   Other ONCE         Objective:     Visit Vitals    BP (!) 181/122 (BP 1 Location: Left arm, BP Patient Position: At rest)  Comment: Bp still high    Pulse 87    Temp 97.8 °F (36.6 °C)    Resp 18    Ht 6' 2\" (1.88 m)    Wt 132.9 kg (292 lb 14.4 oz)    SpO2 94%    BMI 37.61 kg/m2       Temp (24hrs), Av.8 °F (36.6 °C), Min:97 °F (36.1 °C), Max:98.6 °F (37 °C)    General: Well developed, well nourished 44 y.o.  male in no acute distress. HEENT: no scleral icterus, no oral lesions  Neck: supple, symmetrical, trachea midline   Cardio:  regular rate and rhythm, S1, S2 normal, no murmur, click, rub or gallop  Chest: inspection normal - no chest wall deformities or tenderness, respiratory effort normal  Lungs: clear to auscultation, no wheezes or rales and unlabored breathing  Abdomen: soft, non-tender. Bowel sounds normal. No masses, no organomegaly. Extremities:  no redness or tenderness in the calves or thighs, left foot edematous with less erythema of the dorsum of the left forefoot, rest of foot covered by dressing.     Labs: Results:   Chemistry Recent Labs      18   0300  18   1300   GLU  78  110*   NA  142  144   K  4.2  4.2   CL  107  108   CO2  27  29   BUN  13  12   CREA  1.23  1.21   CA  8.6  8.7   AGAP  8  7   BUCR  11*  10*   AP  51   --    TP  7.0   --    ALB  3.3*   --    GLOB  3.7   --    AGRAT  0.9   --       CBC w/Diff Recent Labs      18   0300  18   1300   WBC  4.6  3.7*   RBC  5.12  5.47   HGB  14.6  15.8   HCT  44.6  47.9   PLT  199  192   GRANS  43  52   LYMPH  38  33   EOS  7*  7*            Microbiology Results  Recent Labs      18   1300  18   1255   CULT  NO GROWTH AFTER 9 HOURS  NO GROWTH AFTER 19 HOURS       Amber Osorio MD  2018  [facility] AT THE [facility]  [phone]

## 2018-04-18 NOTE — PROGRESS NOTES
0745 Received pt sleeping on bed, head elevated to 45 degrees. IV site no sign of infiltration. Left foot open to air, edematous, no drainage noted. Skin is intact, pt is ambulatory but kept on bedrest as per MD order. Pt ambulated to bathroom. Denies any pain. 0900 Dressing placed by MD. Clean dry and intact, toes open to air with aquacel ag rope in between toes. 1420 Pt blood pressure very high, denies any pain. Procardia PO given this morning. 1425 MD notified of BP, Lopressor 25mg PO ordered. 1430 Lopressor given , will recheck BP in  an hour. 1530 Rechecked BP, BP went down as documented. Per pt, he feels anxious and could be one of the reasons why his BP is high. Advised pt to relax and listen to music.     1700 Pt denies any pain. 1830 Pt resting on bed. Denies any pain. Dressing on the left leg looks dry and intact. Per pt, edema has subsided a little bit compared to the first day he came in. Denies any discomfort. Skin is intact, IV site no sign of infiltration.

## 2018-04-18 NOTE — PROGRESS NOTES
conducted an initial consultation and Spiritual Assessment for Nataliia Suresh, who is a 44 y. o.,male. Patients Primary Language is: [place]. According to the patients EMR Jehovah's witness Affiliation is: No Gnosticist. The reason the Patient came to the hospital is:   Patient Active Problem List    Diagnosis Date Noted    Cellulitis 04/17/2018        The  provided the following Interventions:  Initiated a relationship of care and support. Explored issues of estefani, belief, spirituality and Spiritism/ritual needs while hospitalized. Listened empathically. Provided information about Spiritual Care Services. Offered prayer and assurance of continued prayers on patient's behalf. Chart reviewed. The following outcomes were achieved:  Patient shared limited information about both their medical narrative and spiritual journey/beliefs. Patient processed feeling about current hospitalization. Patient expressed gratitude for 's visit. Assessment:  Patient does not have any Spiritism/cultural needs that will affect patients preferences in health care. There are no further spiritual or Spiritism issues which require intervention at this time. Plan:  Chaplains will continue to follow and will provide pastoral care on an as needed/requested basis.  recommends bedside caregivers page  on duty if patient shows signs of acute spiritual or emotional distress. Bibi Ballard M.Div.   [facility] [address]  [phone]

## 2018-04-18 NOTE — PROGRESS NOTES
1407:  Notified by CNA of elevated BP. Unaware that a nurse was previously notified and has already paged the hospitalist.  I will continue to monitor.

## 2018-04-18 NOTE — PROGRESS NOTES
Problem: Discharge Planning  Goal: *Discharge to safe environment  Outcome: Progressing Towards Goal  Home with possibly hh

## 2018-04-18 NOTE — ROUTINE PROCESS
Bedside and Verbal shift change report given to Flor Hathaway RN by Supriya Vang RN. Report included the following information SBAR, Kardex, Intake/Output and MAR.

## 2018-04-18 NOTE — CONSULTS
Podiatry Consultation Note    Assessment:       Patient Active Problem List   Diagnosis Code    Cellulitis L03.90       Plan:     Reviewed radiological and clinical findings with the patient in great detail. All questions addressed and answered. Will start [address] with Aquacel AG Q 48 hrs left foot. .Will benefit from soft tissue debridement with lavage and cx lateral this week if no improvement. Will monitor     Subjective:     I was asked by Dr. Kaylen Vazquez to evaluate Luis Davies for foot infection secondary to a puncture wound to the left foot which has gotten progressively worse over the last 2 weeks. Patient step on a nail through his shoe. Previous treatment consist of epsom salt soaks. Mona Reed He  is a 44 y.o. male admitted on 4/17/2018 for Cellulitis. No Known Allergies    Current Facility-Administered Medications   Medication Dose Route Frequency    NIFEdipine ER (PROCARDIA XL) tablet 30 mg  30 mg Oral DAILY    0.9% sodium chloride infusion  50 mL/hr IntraVENous CONTINUOUS    acetaminophen (TYLENOL) tablet 650 mg  650 mg Oral Q4H PRN    ondansetron (ZOFRAN) injection 4 mg  4 mg IntraVENous Q4H PRN    enoxaparin (LOVENOX) injection 40 mg  40 mg SubCUTAneous Q24H    piperacillin-tazobactam (ZOSYN) 3.375 g in 0.9% sodium chloride (MBP/ADV) 100 mL MBP  3.375 g IntraVENous Q6H    clindamycin (CLEOCIN) capsule 300 mg  300 mg Oral Q6H    VANCOMYCIN INFORMATION NOTE   Other Rx Dosing/Monitoring    vancomycin (VANCOCIN) 1,500 mg in 0.9% sodium chloride 500 mL IVPB  1,500 mg IntraVENous Q8H    HYDROcodone-acetaminophen (NORCO) 5-325 mg per tablet 1 Tab  1 Tab Oral Q6H PRN    [START ON 4/19/2018] VANCOMYCIN INFORMATION NOTE   Other ONCE       Past Medical History:   Diagnosis Date    Asthma     Hypertension      History reviewed. No pertinent surgical history. History reviewed. No pertinent family history.   Social History     Social History    Marital status: SINGLE     Spouse name: N/A    Number of children: N/A    Years of education: N/A     Occupational History    Not on file. Social History Main Topics    Smoking status: Current Some Day Smoker    Smokeless tobacco: Never Used    Alcohol use No    Drug use: Not on file    Sexual activity: Not on file     Other Topics Concern    Not on file     Social History Narrative       Physical Exam:  GENERAL: alert, cooperative, no distress, appears stated age    REVIEW OF SYSTEMS:  General: denies chronic fatigue, weight loss, fever, anemia, bruising, depression, nervousness, panic attacks  HEENT: denies ringing in ears, ear infections, dizzy spells, poor vision, glaucoma, sinus trouble, hoarseness, eye infections  GI: denies diarrhea, gas, bloating, heartburn, regurgitation, difficulty swallowing, painful swallowing, nausea, vomiting, constipation, abdominal pain, decreased appetite, blood in stools, black stools, jaundice, dark urine  Lungs: denies pneumonia, asthma, cough, SOB, hemoptysis  Heart: denies chest pain, irregular heart beat, ankle swelling, HTN  Skin: denies rashes, hives, allergic reaction  Urinary: denies UTI, kidney stones, decreased urine force and flow, urination at night, blood in urine, painful urination  Bones and Joints: denies arthritis, rheumatism, back pain, gout, osteoporosis  Neurologic: denies stroke, seizures, headaches, numbness, tingling      Vitals:    04/17/18 1622 04/17/18 2020 04/18/18 0415 04/18/18 0731   BP: (!) 163/111 (!) 162/120 (!) 157/113 (!) 169/111   Pulse: 73 63 66 67   Resp: 18 18 18 18   Temp: 97 °F (36.1 °C) 98 °F (36.7 °C) 97.7 °F (36.5 °C) 97.8 °F (36.6 °C)   SpO2: 100% 95% 96% 94%   Weight:       Height:           OBJECTIVE:    Patient is alert, well developed, well nourished, cooperative, pleasant and in no apparent distress. Lower Extremity Exam:     VASCULAR EXAM:. Pedal pulses are palpable 1/4 DP 2/4 PT right and  0/4 DP and PT left foot but heard with doppler and are biphasic.  Skin temperature is warm to warm right and left foot. Digital capillary fill time is 3 seconds right and left foot. There is/ edema of the left foot and ankle. NEUROLOGICAL EXAM:. Sensation Intact with 5.07g monofilament wire right and left foot. Deep tendon reflexes intact and symmetrical on the right and left foot. Proprioception intact bilateral.     MUSCULOSKELETAL EXAM:. Muscle tone is normall. Muscle strength of the flexor and extensor group inversion and eversion Bilateral. 5/5. MYCOTIC NAIL Dystrophic nail 1,2,3,4,5 bilateral. Elongated thickened nails 1,2,3,4,5 bilateral     DERMATOLOGICAL EXAM:. Skin is of normal texture and turgor right, abnormal texture and turgor with hyperpigmentation and hyperkeratosis noted to the digits and webspaces left foot. There is no apparent punchure wound but there is exudate noted from the  Plantar sulcus of the 2nd and 3rd digits left foot. Recent Results (from the past 24 hour(s))   CULTURE, BLOOD    Collection Time: 04/17/18 12:55 PM   Result Value Ref Range    Special Requests: PERIPHERAL      Culture result: PENDING    POC LACTIC ACID    Collection Time: 04/17/18 12:58 PM   Result Value Ref Range    Lactic Acid (POC) 1.1 0.4 - 2.0 mmol/L   CBC WITH AUTOMATED DIFF    Collection Time: 04/17/18  1:00 PM   Result Value Ref Range    WBC 3.7 (L) 4.6 - 13.2 K/uL    RBC 5.47 4.70 - 5.50 M/uL    HGB 15.8 13.0 - 16.0 g/dL    HCT 47.9 36.0 - 48.0 %    MCV 87.6 74.0 - 97.0 FL    MCH 28.9 24.0 - 34.0 PG    MCHC 33.0 31.0 - 37.0 g/dL    RDW 13.2 11.6 - 14.5 %    PLATELET 950 837 - 430 K/uL    MPV 10.0 9.2 - 11.8 FL    NEUTROPHILS 52 40 - 73 %    LYMPHOCYTES 33 21 - 52 %    MONOCYTES 7 3 - 10 %    EOSINOPHILS 7 (H) 0 - 5 %    BASOPHILS 1 0 - 2 %    ABS. NEUTROPHILS 1.9 1.8 - 8.0 K/UL    ABS. LYMPHOCYTES 1.2 0.9 - 3.6 K/UL    ABS. MONOCYTES 0.3 0.05 - 1.2 K/UL    ABS. EOSINOPHILS 0.3 0.0 - 0.4 K/UL    ABS.  BASOPHILS 0.1 (H) 0.0 - 0.06 K/UL    DF AUTOMATED     METABOLIC PANEL, BASIC Collection Time: 04/17/18  1:00 PM   Result Value Ref Range    Sodium 144 136 - 145 mmol/L    Potassium 4.2 3.5 - 5.5 mmol/L    Chloride 108 100 - 108 mmol/L    CO2 29 21 - 32 mmol/L    Anion gap 7 3.0 - 18 mmol/L    Glucose 110 (H) 74 - 99 mg/dL    BUN 12 7.0 - 18 MG/DL    Creatinine 1.21 0.6 - 1.3 MG/DL    BUN/Creatinine ratio 10 (L) 12 - 20      GFR est AA >60 >60 ml/min/1.73m2    GFR est non-AA >60 >60 ml/min/1.73m2    Calcium 8.7 8.5 - 10.1 MG/DL   SED RATE (ESR)    Collection Time: 04/17/18  1:00 PM   Result Value Ref Range    Sed rate, automated 3 0 - 15 mm/hr   C REACTIVE PROTEIN, QT    Collection Time: 04/17/18  1:00 PM   Result Value Ref Range    C-Reactive protein 0.3 0 - 0.3 mg/dL   CBC WITH AUTOMATED DIFF    Collection Time: 04/18/18  3:00 AM   Result Value Ref Range    WBC 4.6 4.6 - 13.2 K/uL    RBC 5.12 4.70 - 5.50 M/uL    HGB 14.6 13.0 - 16.0 g/dL    HCT 44.6 36.0 - 48.0 %    MCV 87.1 74.0 - 97.0 FL    MCH 28.5 24.0 - 34.0 PG    MCHC 32.7 31.0 - 37.0 g/dL    RDW 13.2 11.6 - 14.5 %    PLATELET 090 974 - 513 K/uL    MPV 10.7 9.2 - 11.8 FL    NEUTROPHILS 43 40 - 73 %    LYMPHOCYTES 38 21 - 52 %    MONOCYTES 11 (H) 3 - 10 %    EOSINOPHILS 7 (H) 0 - 5 %    BASOPHILS 1 0 - 2 %    ABS. NEUTROPHILS 2.0 1.8 - 8.0 K/UL    ABS. LYMPHOCYTES 1.7 0.9 - 3.6 K/UL    ABS. MONOCYTES 0.5 0.05 - 1.2 K/UL    ABS. EOSINOPHILS 0.3 0.0 - 0.4 K/UL    ABS.  BASOPHILS 0.1 (H) 0.0 - 0.06 K/UL    DF AUTOMATED     METABOLIC PANEL, COMPREHENSIVE    Collection Time: 04/18/18  3:00 AM   Result Value Ref Range    Sodium 142 136 - 145 mmol/L    Potassium 4.2 3.5 - 5.5 mmol/L    Chloride 107 100 - 108 mmol/L    CO2 27 21 - 32 mmol/L    Anion gap 8 3.0 - 18 mmol/L    Glucose 78 74 - 99 mg/dL    BUN 13 7.0 - 18 MG/DL    Creatinine 1.23 0.6 - 1.3 MG/DL    BUN/Creatinine ratio 11 (L) 12 - 20      GFR est AA >60 >60 ml/min/1.73m2    GFR est non-AA >60 >60 ml/min/1.73m2    Calcium 8.6 8.5 - 10.1 MG/DL    Bilirubin, total 0.4 0.2 - 1.0 MG/DL ALT (SGPT) 27 16 - 61 U/L    AST (SGOT) 28 15 - 37 U/L    Alk. phosphatase 51 45 - 117 U/L    Protein, total 7.0 6.4 - 8.2 g/dL    Albumin 3.3 (L) 3.4 - 5.0 g/dL    Globulin 3.7 2.0 - 4.0 g/dL    A-G Ratio 0.9 0.8 - 1.7         Imaging:  Left foot     History: Left foot pain, stepped on nail    Comparison: None    Findings: Three views of the foot demonstrate no fracture or dislocation. No definite soft  tissue gas is identified. Mild dorsal soft tissue swelling is present. Alignment  is within normal limits. No radiopaque foreign body is seen. Impression   IMPRESSION:    No fracture. Mild dorsal forefoot soft tissue swelling, nonspecific perhaps cellulitis or  contusion. Lab and Collection   XR FOOT LT MIN 3 V (Order #[id number]) on 4/17/2018 - Lab and Collection Information       Final result (Exam End: 4/17/2018  3:11 PM) Open    Study Result   CT of the left foot, with IV contrast     COMPARISON: Left foot radiographs 4/17/2018     INDICATION: Stepped on nail 2 weeks ago with worsening left foot pain and  swelling, purulent discharge; evaluate extent of soft tissue infection,  osteomyelitis     TECHNIQUE: Contiguous axial 2.5 mm CT images were obtained through the left foot  using soft tissue and bone algorithms after intravenous contrast administration. Sagittal and coronal reformations were also obtained.     One or more dose reduction techniques were used on this CT: automated exposure  control, adjustment of the mAs and/or kVp according to patient's size, and  iterative reconstruction techniques. The specific techniques utilized on this CT  exam have been documented in the patient's electronic medical record.     FINDINGS:     There is relatively mild, mostly dorsal left foot superficial and deep soft  tissue swelling. Findings are most suggestive of a mild cellulitis. There is no  focal soft tissue abscess in this distribution.  Intrinsic musculature of the  left foot appears normal in caliber and density. No CT findings of myositis.     Slightly separate from most of the dorsal left foot soft tissue swelling there  is bandlike linear inflammatory stranding near the region of the second  metatarsal head. The overlying soft tissues in this distribution appears  somewhat irregular. There is no discrete soft tissue ulcer or sinus tract by CT. No abscess within the plantar soft tissues of the left foot.     No soft tissue emphysema. No fascial thickening or asymmetric nodularity. No  asymmetric deep distribution of suspected cellulitis. There are no findings to  suggest a necrotizing fasciitis. No radiodense foreign body still present within  the left foot.     No joint effusion at the second metatarsophalangeal joint or elsewhere. There  are no findings to suggest septic arthritis.     Small well-corticated os navicularis. There are mild degenerative changes of the  left ankle, to include asymmetric medial joint space narrowing with  moderate-sized anteromedial tibial plafond marginal osteophytic spurring and  moderate sized dorsal marginal osteophytosis of the anterior talar dome. No  significant degenerative changes elsewhere within the left ankle or foot.     No cortical discontinuity, intramedullary enhancement, periosteal reaction or  other secondary findings of osteomyelitis.     IMPRESSION  IMPRESSION:     1. Mild, mostly dorsal left foot cellulitis. There is linear inflammatory  stranding is also seen in the plantar soft tissues subjacent to the second  metatarsal head, reported site of puncture wound. No mature sinus tract or soft  tissue abscess. 2. There is no necrotizing fasciitis, septic arthritis or osteomyelitis. No  residual radiodense foreign body.        Maria R Darby DPM  [facility]  [phone] [place]  4/18/2018, 8:02 AM

## 2018-04-19 ENCOUNTER — HOME HEALTH ADMISSION (OUTPATIENT)
Dept: HOME HEALTH SERVICES | Facility: HOME HEALTH | Age: 38
End: 2018-04-19

## 2018-04-19 VITALS
TEMPERATURE: 98.2 F | WEIGHT: 292.9 LBS | DIASTOLIC BLOOD PRESSURE: 100 MMHG | SYSTOLIC BLOOD PRESSURE: 153 MMHG | HEART RATE: 74 BPM | HEIGHT: 74 IN | RESPIRATION RATE: 16 BRPM | BODY MASS INDEX: 37.59 KG/M2 | OXYGEN SATURATION: 95 %

## 2018-04-19 LAB
ALBUMIN SERPL-MCNC: 3.4 G/DL (ref 3.4–5)
ALBUMIN/GLOB SERPL: 0.9 {RATIO} (ref 0.8–1.7)
ALP SERPL-CCNC: 50 U/L (ref 45–117)
ALT SERPL-CCNC: 26 U/L (ref 16–61)
ANION GAP SERPL CALC-SCNC: 13 MMOL/L (ref 3–18)
AST SERPL-CCNC: 23 U/L (ref 15–37)
BASOPHILS # BLD: 0.1 K/UL (ref 0–0.06)
BASOPHILS NFR BLD: 1 % (ref 0–2)
BILIRUB SERPL-MCNC: 0.4 MG/DL (ref 0.2–1)
BUN SERPL-MCNC: 12 MG/DL (ref 7–18)
BUN/CREAT SERPL: 10 (ref 12–20)
CALCIUM SERPL-MCNC: 8.7 MG/DL (ref 8.5–10.1)
CHLORIDE SERPL-SCNC: 106 MMOL/L (ref 100–108)
CO2 SERPL-SCNC: 27 MMOL/L (ref 21–32)
CREAT SERPL-MCNC: 1.19 MG/DL (ref 0.6–1.3)
DATE LAST DOSE: ABNORMAL
DIFFERENTIAL METHOD BLD: ABNORMAL
EOSINOPHIL # BLD: 0.3 K/UL (ref 0–0.4)
EOSINOPHIL NFR BLD: 6 % (ref 0–5)
ERYTHROCYTE [DISTWIDTH] IN BLOOD BY AUTOMATED COUNT: 13.3 % (ref 11.6–14.5)
GLOBULIN SER CALC-MCNC: 3.6 G/DL (ref 2–4)
GLUCOSE SERPL-MCNC: 93 MG/DL (ref 74–99)
HCT VFR BLD AUTO: 44.1 % (ref 36–48)
HGB BLD-MCNC: 14.6 G/DL (ref 13–16)
LYMPHOCYTES # BLD: 1.3 K/UL (ref 0.9–3.6)
LYMPHOCYTES NFR BLD: 29 % (ref 21–52)
MCH RBC QN AUTO: 28.6 PG (ref 24–34)
MCHC RBC AUTO-ENTMCNC: 33.1 G/DL (ref 31–37)
MCV RBC AUTO: 86.3 FL (ref 74–97)
MONOCYTES # BLD: 0.5 K/UL (ref 0.05–1.2)
MONOCYTES NFR BLD: 11 % (ref 3–10)
NEUTS SEG # BLD: 2.5 K/UL (ref 1.8–8)
NEUTS SEG NFR BLD: 53 % (ref 40–73)
PLATELET # BLD AUTO: 170 K/UL (ref 135–420)
PMV BLD AUTO: 10.1 FL (ref 9.2–11.8)
POTASSIUM SERPL-SCNC: 3.8 MMOL/L (ref 3.5–5.5)
PROT SERPL-MCNC: 7 G/DL (ref 6.4–8.2)
RBC # BLD AUTO: 5.11 M/UL (ref 4.7–5.5)
REPORTED DOSE,DOSE: ABNORMAL UNITS
REPORTED DOSE/TIME,TMG: 1900
SODIUM SERPL-SCNC: 146 MMOL/L (ref 136–145)
VANCOMYCIN TROUGH SERPL-MCNC: 24.1 UG/ML (ref 10–20)
WBC # BLD AUTO: 4.7 K/UL (ref 4.6–13.2)

## 2018-04-19 PROCEDURE — 74011000258 HC RX REV CODE- 258: Performed by: INTERNAL MEDICINE

## 2018-04-19 PROCEDURE — 36415 COLL VENOUS BLD VENIPUNCTURE: CPT | Performed by: HOSPITALIST

## 2018-04-19 PROCEDURE — 80202 ASSAY OF VANCOMYCIN: CPT | Performed by: HOSPITALIST

## 2018-04-19 PROCEDURE — 74011250636 HC RX REV CODE- 250/636: Performed by: HOSPITALIST

## 2018-04-19 PROCEDURE — 74011250636 HC RX REV CODE- 250/636: Performed by: INTERNAL MEDICINE

## 2018-04-19 PROCEDURE — 77030037878 HC DRSG MEPILEX >48IN BORD MOLN -B

## 2018-04-19 PROCEDURE — 74011250637 HC RX REV CODE- 250/637: Performed by: HOSPITALIST

## 2018-04-19 PROCEDURE — 80053 COMPREHEN METABOLIC PANEL: CPT | Performed by: HOSPITALIST

## 2018-04-19 PROCEDURE — 74011250637 HC RX REV CODE- 250/637: Performed by: INTERNAL MEDICINE

## 2018-04-19 PROCEDURE — 85025 COMPLETE CBC W/AUTO DIFF WBC: CPT | Performed by: HOSPITALIST

## 2018-04-19 RX ORDER — HYDROCODONE BITARTRATE AND ACETAMINOPHEN 5; 325 MG/1; MG/1
1 TABLET ORAL
Qty: 20 TAB | Refills: 0 | Status: SHIPPED | OUTPATIENT
Start: 2018-04-19 | End: 2018-04-23

## 2018-04-19 RX ORDER — CLINDAMYCIN HYDROCHLORIDE 300 MG/1
300 CAPSULE ORAL EVERY 6 HOURS
Qty: 20 CAP | Refills: 0 | Status: SHIPPED | OUTPATIENT
Start: 2018-04-19 | End: 2018-04-24

## 2018-04-19 RX ORDER — CIPROFLOXACIN 500 MG/1
500 TABLET ORAL 2 TIMES DAILY
Qty: 10 TAB | Refills: 0 | Status: SHIPPED | OUTPATIENT
Start: 2018-04-19 | End: 2018-04-24

## 2018-04-19 RX ORDER — METOPROLOL TARTRATE 25 MG/1
25 TABLET, FILM COATED ORAL ONCE
Status: COMPLETED | OUTPATIENT
Start: 2018-04-19 | End: 2018-04-19

## 2018-04-19 RX ORDER — METOPROLOL TARTRATE 25 MG/1
25 TABLET, FILM COATED ORAL 2 TIMES DAILY
Qty: 60 TAB | Refills: 0 | Status: SHIPPED | OUTPATIENT
Start: 2018-04-19 | End: 2019-04-26

## 2018-04-19 RX ADMIN — SODIUM CHLORIDE 1500 MG: 900 INJECTION, SOLUTION INTRAVENOUS at 09:29

## 2018-04-19 RX ADMIN — METOPROLOL TARTRATE 25 MG: 25 TABLET ORAL at 00:45

## 2018-04-19 RX ADMIN — METOPROLOL TARTRATE 25 MG: 25 TABLET ORAL at 09:29

## 2018-04-19 RX ADMIN — PIPERACILLIN SODIUM,TAZOBACTAM SODIUM 3.38 G: 3; .375 INJECTION, POWDER, FOR SOLUTION INTRAVENOUS at 06:00

## 2018-04-19 RX ADMIN — CLINDAMYCIN HYDROCHLORIDE 300 MG: 150 CAPSULE ORAL at 12:16

## 2018-04-19 RX ADMIN — CLINDAMYCIN HYDROCHLORIDE 300 MG: 150 CAPSULE ORAL at 06:00

## 2018-04-19 NOTE — PROGRESS NOTES
Problem: Falls - Risk of  Goal: *Absence of Falls  Document Robin Fall Risk and appropriate interventions in the flowsheet.    Outcome: Progressing Towards Goal  Fall Risk Interventions:  Mobility Interventions: Communicate number of staff needed for ambulation/transfer, Patient to call before getting OOB     Medication Interventions: Patient to call before getting OOB, Teach patient to arise slowly    Elimination Interventions: Call light in reach, Patient to call for help with toileting needs, Toileting schedule/hourly rounds, Urinal in reach, Toilet paper/wipes in reach    Problem: Hypertension  Goal: *Blood pressure within specified parameters  Outcome: Progressing Towards Goal  Patient Vitals for the past 4 hrs:   Temp Pulse Resp BP SpO2   04/18/18 2022 98.2 °F (36.8 °C) 71 16 (!) 155/97 95 %

## 2018-04-19 NOTE — ROUTINE PROCESS
I have reviewed discharge instructions with the patient. The patient verbalized understanding. Waiting on case management for home health and medications to be delivered.

## 2018-04-19 NOTE — ROUTINE PROCESS
0710 Received pt resting on bed, eyes closed, easily awaken. Denies any pain. Left foot with elastic bandage c/d/i. New IV site on left FA no sign of infiltration. Skin is intact. Pt still on bedrest but ambulatory. Pt voiding well. Afebrile, SBP running from 150s to 160s. 1040 Pt discharged, discharge instructions given by discharge RN, pt verbalized understanding. IV d/c, no sign of infiltration. Dressing on the left foot c/d/i. Denies any pain. Afebrile, BP running a little high, MD aware, discharged with medication for BP control. Pt with home health consult for dressing changes. 200 Pt awaiting for medication from pharmac atrium. 1330 Pt wheeled down. Denies any discomfort.

## 2018-04-19 NOTE — DISCHARGE SUMMARY
[facility]    Discharge Summary    Patient: Tessy Maynard MRN: [id number]  CSN: [id number]    YOB: 1980  Age: 44 y.o. Sex: male    DOA: 4/17/2018 LOS:  LOS: 2 days   Discharge Date: 4/19/2018     Admission Diagnoses: Cellulitis    Discharge Diagnoses:    Problem List as of 4/19/2018  Date Reviewed: 10/19/2014          Codes Class Noted - Resolved    * (Principal)Cellulitis ICD-10-CM: L03.90  ICD-9-CM: 682.9  4/17/2018 - Present              Discharge Condition: Stable    Discharge To: Home    Consults: Hospitalist, Infectious Disease and Podiatry    Hospital Course: 43 y/o [facility] American male with hx of asthma and HTN (not on medication), presented to the ED on 4/17/2018 with left foot infection, secondary to puncture wound through his shoe after stepping on a nail ~2 weeks prior. He immediately self treated at home with Epsom salt soaks. About 2-3 days later, he noticed swelling in his left foot but still ambulated using boots. Later, he noticed redness of the foot with increasing pain. Work up in the ED revealed erythema dorsal left foot. Xray showed mild dorsal forefoot soft tissue swelling, nonspecific perhaps cellulitis or contusion. CT left foot mild, mostly dorsal left foot cellulitis,  linear inflammatory stranding is also seen in the plantar soft tissues subjacent to the second metatarsal head, reported site of puncture wound, no mature sinus tract or soft tissue abscess. Blood cultures collected 4/17/2018 no growth after 19 hours x 2. CRP and WBC normal.  ID and podiatry consulted. He was placed on abx and admitted for further management of care. He received [facility] with Aquacel AG and AG rope interdigital Q 48-72 hrs. LLE improved, he remained afebrile w/o leukocytosis. No open wound to left foot between 2nd/3rd digits, erythema improving, pain controlled with meds. He is stable for discharge home to follow up with home health and wound care.   He is to follow up with PCP within one week and with Dr. Marcelino Graves, podiatrist, within 3-7 days after discharge, then in 2 weeks in the wound care clinic. Physical Exam:  General appearance: alert, cooperative, no distress, appears stated age  Head: Normocephalic, without obvious abnormality, atraumatic  Lungs: clear to auscultation bilaterally  Heart: regular rate and rhythm, S1, S2 normal, no murmur, click, rub or gallop  Abdomen: soft, non-tender. Bowel sounds normal. No masses,  no organomegaly  Extremities: cellulitis LLE improving, dressing to left foot intact  Skin: hyperpigmentation noted to left foot  Neurologic: Grossly normal  PSY: Mood and affect normal, appropriately behaved    Significant Diagnostic Studies: labs:   Recent Results (from the past 24 hour(s))   VANCOMYCIN, TROUGH    Collection Time: 04/19/18  2:20 AM   Result Value Ref Range    Vancomycin,trough 24.1 (HH) 10.0 - 20.0 ug/mL    Reported dose date: 20180418      Reported dose time: 1900      Reported dose: 1,500 MG UNITS   CBC WITH AUTOMATED DIFF    Collection Time: 04/19/18  2:20 AM   Result Value Ref Range    WBC 4.7 4.6 - 13.2 K/uL    RBC 5.11 4.70 - 5.50 M/uL    HGB 14.6 13.0 - 16.0 g/dL    HCT 44.1 36.0 - 48.0 %    MCV 86.3 74.0 - 97.0 FL    MCH 28.6 24.0 - 34.0 PG    MCHC 33.1 31.0 - 37.0 g/dL    RDW 13.3 11.6 - 14.5 %    PLATELET 286 054 - 745 K/uL    MPV 10.1 9.2 - 11.8 FL    NEUTROPHILS 53 40 - 73 %    LYMPHOCYTES 29 21 - 52 %    MONOCYTES 11 (H) 3 - 10 %    EOSINOPHILS 6 (H) 0 - 5 %    BASOPHILS 1 0 - 2 %    ABS. NEUTROPHILS 2.5 1.8 - 8.0 K/UL    ABS. LYMPHOCYTES 1.3 0.9 - 3.6 K/UL    ABS. MONOCYTES 0.5 0.05 - 1.2 K/UL    ABS. EOSINOPHILS 0.3 0.0 - 0.4 K/UL    ABS.  BASOPHILS 0.1 (H) 0.0 - 0.06 K/UL    DF AUTOMATED     METABOLIC PANEL, COMPREHENSIVE    Collection Time: 04/19/18  2:20 AM   Result Value Ref Range    Sodium 146 (H) 136 - 145 mmol/L    Potassium 3.8 3.5 - 5.5 mmol/L    Chloride 106 100 - 108 mmol/L    CO2 27 21 - 32 mmol/L    Anion gap 13 3.0 - 18 mmol/L Glucose 93 74 - 99 mg/dL    BUN 12 7.0 - 18 MG/DL    Creatinine 1.19 0.6 - 1.3 MG/DL    BUN/Creatinine ratio 10 (L) 12 - 20      GFR est AA >60 >60 ml/min/1.73m2    GFR est non-AA >60 >60 ml/min/1.73m2    Calcium 8.7 8.5 - 10.1 MG/DL    Bilirubin, total 0.4 0.2 - 1.0 MG/DL    ALT (SGPT) 26 16 - 61 U/L    AST (SGOT) 23 15 - 37 U/L    Alk. phosphatase 50 45 - 117 U/L    Protein, total 7.0 6.4 - 8.2 g/dL    Albumin 3.4 3.4 - 5.0 g/dL    Globulin 3.6 2.0 - 4.0 g/dL    A-G Ratio 0.9 0.8 - 1.7           Discharge Medications:     Discharge Medication List as of 4/19/2018 10:43 AM      START taking these medications    Details   clindamycin (CLEOCIN) 300 mg capsule Take 1 Cap by mouth every six (6) hours for 5 days. , Print, Disp-20 Cap, R-0      metoprolol tartrate (LOPRESSOR) 25 mg tablet Take 1 Tab by mouth two (2) times a day., Print, Disp-60 Tab, R-0      HYDROcodone-acetaminophen (NORCO) 5-325 mg per tablet Take 1 Tab by mouth every six (6) hours as needed for up to 4 days. Max Daily Amount: 4 Tabs., Print, Disp-20 Tab, R-0      ciprofloxacin HCl (CIPRO) 500 mg tablet Take 1 Tab by mouth two (2) times a day for 5 days. , Print, Disp-10 Tab, R-0         CONTINUE these medications which have NOT CHANGED    Details   albuterol (PROVENTIL HFA, VENTOLIN HFA, PROAIR HFA) 90 mcg/actuation inhaler Take 2 Puffs by inhalation. , Historical Med      NIFEdipine ER (PROCARDIA XL) 30 mg ER tablet Take 1 Tab by mouth daily. , Print, Disp-15 Tab, R-0                 Activity: Activity as tolerated    Diet: Cardiac Diet    Wound Care: [facility] with Aquacel AG and AG rope interdigital Q 48-72 hrs . Will see patient 3-7 days after discharge then in 2 weeks at the wound clinic    Follow-up: Follow up with PCP within one week. Follow up with podiatry, Dr. Bro Cease within 3-7 days after discharge, then in 2 weeks at the wound clinic.     Discharge time: > 35 mins  Jeremy Iniguez NP  4/19/2018, 2:09 PM

## 2018-04-19 NOTE — PROGRESS NOTES
Pharmacy Dosing Services: Vancomycin    Indication: cSSSI    Day of therapy: 2 of 7 days    Other Antimicrobials (Include dose, start day & day of therapy):  Clindamycin 300 mg IV q6h. Day 0: -. Day 2. PipTazo 3.375g IV q8h E.I (renally adjusted). Day 0: . Day 2. Loading dose (date given): 2 g  Current Maintenance dose: 1.5 g IV q8h    Goal Vancomycin Level: 15-20  (Trough 15-20 for most infections, 20 for meningitis/osteomyelitis, pre-HD level ~25)    Vancomycin Level (if drawn): 24.1     Significant Cultures: negative, pending final.    Renal function stable? (unstable defined as SCr increase of 0.5 mg/dL or > 50% increase from baseline, whichever is greater) (Y/N): Y     CAPD, Hemodialysis or Renal Replacement Therapy (Y/N): N     Recent Labs      18   0220  18   0300  18   1300   CREA  1.19  1.23  1.21   BUN  12  13  12   WBC  4.7  4.6  3.7*     Temp (24hrs), Av.7 °F (36.5 °C), Min:97 °F (36.1 °C), Max:98.2 °F (36.8 °C)    Creatinine Clearance (Creatinine Clearance (ml/min)): 122     Regimen assessment: extrapolated trough ~17 mcg/ml (from 24.1, 6 hrs). Therapeutic. Maintenance dose: continue with the same dose  Next scheduled level: 730     Pharmacy will follow daily and adjust medications as appropriate for renal function and/or serum levels.     Thank you,  Josephine Jimenez PhD

## 2018-04-19 NOTE — ROUTINE PROCESS
Bedside and Verbal shift change report given to [employer] (oncoming nurse) by Sully Castillo   (offgoing nurse). Report included the following information SBAR, Kardex, Intake/Output and MAR.

## 2018-04-19 NOTE — DISCHARGE INSTRUCTIONS
DISCHARGE SUMMARY from Nurse    PATIENT INSTRUCTIONS:    After general anesthesia or intravenous sedation, for 24 hours or while taking prescription Narcotics:  · Limit your activities  · Do not drive and operate hazardous machinery  · Do not make important personal or business decisions  · Do  not drink alcoholic beverages  · If you have not urinated within 8 hours after discharge, please contact your surgeon on call. Report the following to your surgeon:  · Excessive pain, swelling, redness or odor of or around the surgical area  · Temperature over 100.5  · Nausea and vomiting lasting longer than 4 hours or if unable to take medications  · Any signs of decreased circulation or nerve impairment to extremity: change in color, persistent  numbness, tingling, coldness or increase pain  · Any questions    What to do at Home:  Recommended activity: Activity as tolerated,     *  Please give a list of your current medications to your Primary Care Provider. *  Please update this list whenever your medications are discontinued, doses are      changed, or new medications (including over-the-counter products) are added. *  Please carry medication information at all times in case of emergency situations. These are general instructions for a healthy lifestyle:    No smoking/ No tobacco products/ Avoid exposure to second hand smoke  Surgeon General's Warning:  Quitting smoking now greatly reduces serious risk to your health. Obesity, smoking, and sedentary lifestyle greatly increases your risk for illness    A healthy diet, regular physical exercise & weight monitoring are important for maintaining a healthy lifestyle    You may be retaining fluid if you have a history of heart failure or if you experience any of the following symptoms:  Weight gain of 3 pounds or more overnight or 5 pounds in a week, increased swelling in our hands or feet or shortness of breath while lying flat in bed.   Please call your doctor as soon as you notice any of these symptoms; do not wait until your next office visit. Recognize signs and symptoms of STROKE:    F-face looks uneven    A-arms unable to move or move unevenly    S-speech slurred or non-existent    T-time-call 911 as soon as signs and symptoms begin-DO NOT go       Back to bed or wait to see if you get better-TIME IS BRAIN. Warning Signs of HEART ATTACK     Call 911 if you have these symptoms:   Chest discomfort. Most heart attacks involve discomfort in the center of the chest that lasts more than a few minutes, or that goes away and comes back. It can feel like uncomfortable pressure, squeezing, fullness, or pain.  Discomfort in other areas of the upper body. Symptoms can include pain or discomfort in one or both arms, the back, neck, jaw, or stomach.  Shortness of breath with or without chest discomfort.  Other signs may include breaking out in a cold sweat, nausea, or lightheadedness. Don't wait more than five minutes to call 911 - MINUTES MATTER! Fast action can save your life. Calling 911 is almost always the fastest way to get lifesaving treatment. Emergency Medical Services staff can begin treatment when they arrive -- up to an hour sooner than if someone gets to the hospital by car. The discharge information has been reviewed with the patient. The patient verbalized understanding. Discharge medications reviewed with the patient and appropriate educational materials and side effects teaching were provided.   ___________________________________________________________________________________________________________________________________

## 2018-04-19 NOTE — PROGRESS NOTES
Podiatry Surgery Progress Note      Patient: Luis Davies MRN: [id number]  SSN: [id number]    YOB: 1980  Age: 44 y.o. Sex: male      Assessment:     Patient Active Problem List   Diagnosis Code    Cellulitis L03.90          Plan: Will start [address] with Aquacel AG and AG rope interdigital Q 48-72 hrs . Will see patient 3-7 days after discharge then in 2 weeks at the wound clinic. Total time spent with patient: 30 [address]95 [address] discussed with: Patient and Consultant/Specialist    Discussed:  D/C Planning    Disposition:  Stable      Mr. Maritza Shaw is a 44 y.o. male who was seen at bedside with no new complaints. Subjective:   Past Medical History  Past Medical History:   Diagnosis Date    Asthma     Hypertension      Social History     Social History    Marital status: SINGLE     Spouse name: N/A    Number of children: N/A    Years of education: N/A     Occupational History    Not on file.      Social History Main Topics    Smoking status: Current Some Day Smoker    Smokeless tobacco: Never Used    Alcohol use No    Drug use: Not on file    Sexual activity: Not on file     Other Topics Concern    Not on file     Social History Narrative       Current Medications  Current Facility-Administered Medications   Medication Dose Route Frequency Provider Last Rate Last Dose    [START ON 4/21/2018] VANCOMYCIN INFORMATION NOTE   Other ONCE Cozette Salts, DO        NIFEdipine ER (PROCARDIA XL) tablet 30 mg  30 mg Oral DAILY Sarmad Espino MD   30 mg at 04/18/18 0940    piperacillin-tazobactam (ZOSYN) 3.375 g in 0.9% sodium chloride (MBP/ADV) 100 mL MBP  #EXTENDED 4 HOUR INFUSION###  3.375 g IntraVENous Meron Campbell MD 25 mL/hr at 04/19/18 0600 3.375 g at 04/19/18 0600    metoprolol tartrate (LOPRESSOR) tablet 25 mg  25 mg Oral BID Sarmad Espino MD   25 mg at 04/19/18 0929    0.9% sodium chloride infusion  50 mL/hr IntraVENous CONTINUOUS [place] Verduzco MD 50 mL/hr at 04/18/18 1751 50 mL/hr at 04/18/18 1751    acetaminophen (TYLENOL) tablet 650 mg  650 mg Oral Q4H PRN Adrianne Russ MD        [facility] 4 mg  4 mg IntraVENous Q4H PRN Adrianne Russ MD        enoxaparin (LOVENOX) injection 40 mg  40 mg SubCUTAneous Q24H Adrianne Russ MD   40 mg at 04/18/18 1651    clindamycin (CLEOCIN) capsule 300 mg  300 mg Oral Q6H Mercy Dumont MD   300 mg at 04/19/18 1216    VANCOMYCIN INFORMATION NOTE   Other Rx Dosing/Monitoring Adrianne Russ MD        vancomycin (VANCOCIN) 1,500 mg in 0.9% sodium chloride 500 mL IVPB  1,500 mg IntraVENous Q8H Adrianne Russ .3 mL/hr at 04/19/18 0929 1,500 mg at 04/19/18 0929    HYDROcodone-acetaminophen (NORCO) 5-325 mg per tablet 1 Tab  1 Tab Oral Q6H PRN Adrianne Russ MD           Patient Allergies  No Known Allergies       Objective:   General Exam  alert, cooperative, no distress, appears stated age    Vitals  Visit Vitals    BP (!) 153/100 (BP 1 Location: Right arm, BP Patient Position: At rest)    Pulse 74    Temp 98.2 °F (36.8 °C)    Resp 16    Ht 6' 2\" (1.88 m)    Wt 132.9 kg (292 lb 14.4 oz)    SpO2 95%    BMI 37.61 kg/m2       REVIEW OF SYSTEMS:  No changes    Foot Exam  VASCULAR EXAM:. Pedal pulses are palpable 1/4 DP 2/4 PT right and  0/4 DP and PT left foot but heard with doppler and are biphasic. Skin temperature is warm to warm right and left foot. Digital capillary fill time is 3 seconds right and left foot. There is/ edema of the left foot and ankle.      NEUROLOGICAL EXAM:. Sensation Intact with 5.07g monofilament wire right and left foot. Deep tendon reflexes intact and symmetrical on the right and left foot. Proprioception intact bilateral.      MUSCULOSKELETAL EXAM:. Muscle tone is normall.   Muscle strength of the flexor and extensor group inversion and eversion Bilateral. 5/5.      MYCOTIC NAIL Dystrophic nail 1,2,3,4,5 bilateral. Elongated thickened nails 1,2,3,4,5 bilateral      DERMATOLOGICAL EXAM:. Skin is of normal texture and turgor right, abnormal texture and turgor with hyperpigmentation and hyperkeratosis noted to the digits and webspaces left foot. There is still no apparent puncture wound with no exudate noted from the plantar sulcus of the 2nd and 3rd digits left foot.       Wound Foot Left (Active)   DRESSING STATUS Clean, dry, and intact 4/19/2018  7:10 AM   DRESSING TYPE Elastic bandage;Gauze wrap (crispin); Silver products 4/19/2018  7:10 AM   Non-Pressure Injury Partial thickness (epider/derm) 4/17/2018  7:55 PM   Condition of [facility] 4/18/2018  7:45 AM   Drainage Amount  None 4/19/2018  7:10 AM   Drainage Color Serous 4/17/2018  7:55 PM   Wound Odor None 4/19/2018  7:10 AM   Periwound Skin Condition Edematous; Erythema, blanchable 4/19/2018  7:10 AM   Number of days:2            Labs  Recent Results (from the past 24 hour(s))   VANCOMYCIN, TROUGH    Collection Time: 04/19/18  2:20 AM   Result Value Ref Range    Vancomycin,trough 24.1 (HH) 10.0 - 20.0 ug/mL    Reported dose date: 20180418      Reported dose time: 1900      Reported dose: 1,500 MG UNITS   CBC WITH AUTOMATED DIFF    Collection Time: 04/19/18  2:20 AM   Result Value Ref Range    WBC 4.7 4.6 - 13.2 K/uL    RBC 5.11 4.70 - 5.50 M/uL    HGB 14.6 13.0 - 16.0 g/dL    HCT 44.1 36.0 - 48.0 %    MCV 86.3 74.0 - 97.0 FL    MCH 28.6 24.0 - 34.0 PG    MCHC 33.1 31.0 - 37.0 g/dL    RDW 13.3 11.6 - 14.5 %    PLATELET 352 855 - 311 K/uL    MPV 10.1 9.2 - 11.8 FL    NEUTROPHILS 53 40 - 73 %    LYMPHOCYTES 29 21 - 52 %    MONOCYTES 11 (H) 3 - 10 %    EOSINOPHILS 6 (H) 0 - 5 %    BASOPHILS 1 0 - 2 %    ABS. NEUTROPHILS 2.5 1.8 - 8.0 K/UL    ABS. LYMPHOCYTES 1.3 0.9 - 3.6 K/UL    ABS. MONOCYTES 0.5 0.05 - 1.2 K/UL    ABS. EOSINOPHILS 0.3 0.0 - 0.4 K/UL    ABS.  BASOPHILS 0.1 (H) 0.0 - 0.06 K/UL    DF AUTOMATED     METABOLIC PANEL, COMPREHENSIVE    Collection Time: 04/19/18  2:20 AM   Result Value Ref Range    Sodium 146 (H) 136 - 145 mmol/L    Potassium 3.8 3.5 - 5.5 mmol/L    Chloride 106 100 - 108 mmol/L    CO2 27 21 - 32 mmol/L    Anion gap 13 3.0 - 18 mmol/L    Glucose 93 74 - 99 mg/dL    BUN 12 7.0 - 18 MG/DL    Creatinine 1.19 0.6 - 1.3 MG/DL    BUN/Creatinine ratio 10 (L) 12 - 20      GFR est AA >60 >60 ml/min/1.73m2    GFR est non-AA >60 >60 ml/min/1.73m2    Calcium 8.7 8.5 - 10.1 MG/DL    Bilirubin, total 0.4 0.2 - 1.0 MG/DL    ALT (SGPT) 26 16 - 61 U/L    AST (SGOT) 23 15 - 37 U/L    Alk. phosphatase 50 45 - 117 U/L    Protein, total 7.0 6.4 - 8.2 g/dL    Albumin 3.4 3.4 - 5.0 g/dL    Globulin 3.6 2.0 - 4.0 g/dL    A-G Ratio 0.9 0.8 - 1.7         X-Ray:    Left foot     History: Left foot pain, stepped on nail    Comparison: None    Findings: Three views of the foot demonstrate no fracture or dislocation. No definite soft  tissue gas is identified. Mild dorsal soft tissue swelling is present. Alignment  is within normal limits. No radiopaque foreign body is seen. Impression   IMPRESSION:    No fracture. Mild dorsal forefoot soft tissue swelling, nonspecific perhaps cellulitis or  contusion. Lab and Collection   XR FOOT LT MIN 3 V (Order #[id number]) on 4/17/2018 - Lab and Collection Information         Final result (Exam End: 4/17/2018  3:11 PM) Open     Study Result   CT of the left foot, with IV contrast      COMPARISON: Left foot radiographs 4/17/2018      INDICATION: Stepped on nail 2 weeks ago with worsening left foot pain and  swelling, purulent discharge; evaluate extent of soft tissue infection,  osteomyelitis      TECHNIQUE: Contiguous axial 2.5 mm CT images were obtained through the left foot  using soft tissue and bone algorithms after intravenous contrast administration.   Sagittal and coronal reformations were also obtained.      One or more dose reduction techniques were used on this CT: automated exposure  control, adjustment of the mAs and/or kVp according to patient's size, and  iterative reconstruction techniques. The specific techniques utilized on this CT  exam have been documented in the patient's electronic medical record.      FINDINGS:      There is relatively mild, mostly dorsal left foot superficial and deep soft  tissue swelling. Findings are most suggestive of a mild cellulitis. There is no  focal soft tissue abscess in this distribution. Intrinsic musculature of the  left foot appears normal in caliber and density. No CT findings of myositis.      Slightly separate from most of the dorsal left foot soft tissue swelling there  is bandlike linear inflammatory stranding near the region of the second  metatarsal head. The overlying soft tissues in this distribution appears  somewhat irregular. There is no discrete soft tissue ulcer or sinus tract by CT. No abscess within the plantar soft tissues of the left foot.      No soft tissue emphysema. No fascial thickening or asymmetric nodularity. No  asymmetric deep distribution of suspected cellulitis. There are no findings to  suggest a necrotizing fasciitis. No radiodense foreign body still present within  the left foot.      No joint effusion at the second metatarsophalangeal joint or elsewhere. There  are no findings to suggest septic arthritis.      Small well-corticated os navicularis. There are mild degenerative changes of the  left ankle, to include asymmetric medial joint space narrowing with  moderate-sized anteromedial tibial plafond marginal osteophytic spurring and  moderate sized dorsal marginal osteophytosis of the anterior talar dome. No  significant degenerative changes elsewhere within the left ankle or foot.      No cortical discontinuity, intramedullary enhancement, periosteal reaction or  other secondary findings of osteomyelitis.      IMPRESSION  IMPRESSION:      1. Mild, mostly dorsal left foot cellulitis.  There is linear inflammatory  stranding is also seen in the plantar soft tissues subjacent to the second  metatarsal head, reported site of puncture wound. No mature sinus tract or soft  tissue abscess. 2. There is no necrotizing fasciitis, septic arthritis or osteomyelitis. No  residual radiodense foreign body.          Procedures:   none               Glenroy Harrison DPM  April 19, 2018

## 2018-04-19 NOTE — PROGRESS NOTES
Offered the pt FOC for home care, he chose [facility]. Pt reported his dc address is :[facility] [facility] [address], [place]. Marce Martinez Referral sent to intake via [facility] and called to the [facility] for f/u. Care Management Interventions  PCP Verified by CM: Yes  Palliative Care Criteria Met (RRAT>21 & CHF Dx)?: No  Mode of Transport at Discharge: Self  Transition of Care Consult (CM Consult): [address]: Yes  Discharge Durable Medical Equipment: No  Physical Therapy Consult: No  Occupational Therapy Consult: No  Speech Therapy Consult: No  Current Support Network:  Other (friend)  Confirm Follow Up Transport: Family  Plan discussed with Pt/Family/Caregiver: Yes  Freedom of Choice Offered: Yes  Discharge Location  Discharge Placement: Home with home health

## 2018-04-19 NOTE — ROUTINE PROCESS
Bedside and Verbal shift change report given to [email or web address] (oncoming nurse) by Rochelle Pandya   (offgoing nurse). Report included the following information SBAR, Kardex, Intake/Output and MAR.

## 2018-04-19 NOTE — PROGRESS NOTES
[address] [facility]  Dr Kumar Horvath paged for pt's BP of 164/116    0038  Ordered additional dose metoprolol for pt's elevated BP per Dr Kumar Horvath    0208  BP recheck: 167/85    0303  Vanco trough critical high; dose held per pharmacy    0708  Bedside and Verbal shift change report given to Salvador Monterroso RN by Yonatan Shirley RN. Report included the following information SBAR, Kardex, Intake/Output and MAR.

## 2018-04-19 NOTE — PROGRESS NOTES
Progress Note      Patient: Valeria Cartagena               Sex: male          DOA: 4/17/2018       YOB: 1980      Age:  44 y.o.        LOS:  LOS: 1 day               Subjective:   Help of id greatly appreciated . Pt is on zosyn, vancomycin and clindamycin. His foot is looking better and the cellulitis up the left  leg has decreased the patient's blood pressure is high and is on procardia and lopressor .  He is afebrile       Objective:      Visit Vitals    BP (!) 155/97    Pulse 71    Temp 98.2 °F (36.8 °C)    Resp 16    Ht 6' 2\" (1.88 m)    Wt 132.9 kg (292 lb 14.4 oz)    SpO2 95%    BMI 37.61 kg/m2       Physical Exam:  Pt is awake and alert   Heart reg rate and rhythm   Lungs good breath sounds heard   Abdomen soft and nontender   Extremities left foot is erythematous  on the dorsum but is improved from yesterday    Neuro nonfocal    Lab/Data Reviewed:  BMP:   Lab Results   Component Value Date/Time     04/18/2018 03:00 AM    K 4.2 04/18/2018 03:00 AM     04/18/2018 03:00 AM    CO2 27 04/18/2018 03:00 AM    AGAP 8 04/18/2018 03:00 AM    GLU 78 04/18/2018 03:00 AM    BUN 13 04/18/2018 03:00 AM    CREA 1.23 04/18/2018 03:00 AM    GFRAA >60 04/18/2018 03:00 AM    GFRNA >60 04/18/2018 03:00 AM     CMP:   Lab Results   Component Value Date/Time     04/18/2018 03:00 AM    K 4.2 04/18/2018 03:00 AM     04/18/2018 03:00 AM    CO2 27 04/18/2018 03:00 AM    AGAP 8 04/18/2018 03:00 AM    GLU 78 04/18/2018 03:00 AM    BUN 13 04/18/2018 03:00 AM    CREA 1.23 04/18/2018 03:00 AM    GFRAA >60 04/18/2018 03:00 AM    GFRNA >60 04/18/2018 03:00 AM    CA 8.6 04/18/2018 03:00 AM    ALB 3.3 (L) 04/18/2018 03:00 AM    TP 7.0 04/18/2018 03:00 AM    GLOB 3.7 04/18/2018 03:00 AM    AGRAT 0.9 04/18/2018 03:00 AM    SGOT 28 04/18/2018 03:00 AM    ALT 27 04/18/2018 03:00 AM     CBC:   Lab Results   Component Value Date/Time    WBC 4.6 04/18/2018 03:00 AM    HGB 14.6 04/18/2018 03:00 AM    HCT 44.6 04/18/2018 03:00 AM     04/18/2018 03:00 AM           Assessment/Plan     Active Problems:    Cellulitis (4/17/2018) in the left foot the pt stepped on a nail   htn      Plan:continue the antibiotics and the bp meds

## 2018-04-20 ENCOUNTER — HOME CARE VISIT (OUTPATIENT)
Dept: HOME HEALTH SERVICES | Facility: HOME HEALTH | Age: 38
End: 2018-04-20

## 2018-04-21 ENCOUNTER — HOME CARE VISIT (OUTPATIENT)
Dept: HOME HEALTH SERVICES | Facility: HOME HEALTH | Age: 38
End: 2018-04-21

## 2018-04-23 LAB
BACTERIA SPEC CULT: NORMAL
BACTERIA SPEC CULT: NORMAL
SERVICE CMNT-IMP: NORMAL
SERVICE CMNT-IMP: NORMAL

## 2019-04-26 ENCOUNTER — HOSPITAL ENCOUNTER (EMERGENCY)
Age: 39
Discharge: HOME | End: 2019-04-26
Attending: EMERGENCY MEDICINE
Payer: MEDICAID

## 2019-04-26 VITALS
OXYGEN SATURATION: 96 % | DIASTOLIC BLOOD PRESSURE: 129 MMHG | HEART RATE: 90 BPM | RESPIRATION RATE: 18 BRPM | TEMPERATURE: 98 F | SYSTOLIC BLOOD PRESSURE: 193 MMHG

## 2019-04-26 DIAGNOSIS — J45.901 ASTHMA WITH ACUTE EXACERBATION, UNSPECIFIED ASTHMA SEVERITY, UNSPECIFIED WHETHER PERSISTENT: ICD-10-CM

## 2019-04-26 DIAGNOSIS — I10 ESSENTIAL HYPERTENSION: Primary | ICD-10-CM

## 2019-04-26 DIAGNOSIS — Z76.0 MEDICATION REFILL: ICD-10-CM

## 2019-04-26 PROCEDURE — 74011636637 HC RX REV CODE- 636/637: Performed by: PHYSICIAN ASSISTANT

## 2019-04-26 PROCEDURE — 74011000250 HC RX REV CODE- 250: Performed by: PHYSICIAN ASSISTANT

## 2019-04-26 PROCEDURE — 77030029684 HC NEB SM VOL KT MONA -A

## 2019-04-26 PROCEDURE — 99283 EMERGENCY DEPT VISIT LOW MDM: CPT

## 2019-04-26 PROCEDURE — 74011250637 HC RX REV CODE- 250/637: Performed by: PHYSICIAN ASSISTANT

## 2019-04-26 PROCEDURE — 94640 AIRWAY INHALATION TREATMENT: CPT

## 2019-04-26 RX ORDER — METOPROLOL TARTRATE 25 MG/1
25 TABLET, FILM COATED ORAL
Status: COMPLETED | OUTPATIENT
Start: 2019-04-26 | End: 2019-04-26

## 2019-04-26 RX ORDER — METOPROLOL TARTRATE 25 MG/1
25 TABLET, FILM COATED ORAL 2 TIMES DAILY
Qty: 60 TAB | Refills: 0 | Status: SHIPPED | OUTPATIENT
Start: 2019-04-26 | End: 2019-09-24

## 2019-04-26 RX ORDER — IPRATROPIUM BROMIDE AND ALBUTEROL SULFATE 2.5; .5 MG/3ML; MG/3ML
3 SOLUTION RESPIRATORY (INHALATION)
Status: COMPLETED | OUTPATIENT
Start: 2019-04-26 | End: 2019-04-26

## 2019-04-26 RX ORDER — NIFEDIPINE 30 MG/1
30 TABLET, EXTENDED RELEASE ORAL
Status: COMPLETED | OUTPATIENT
Start: 2019-04-26 | End: 2019-04-26

## 2019-04-26 RX ORDER — ALBUTEROL SULFATE 90 UG/1
2 AEROSOL, METERED RESPIRATORY (INHALATION)
Qty: 1 INHALER | Refills: 0 | Status: SHIPPED | OUTPATIENT
Start: 2019-04-26 | End: 2019-09-24

## 2019-04-26 RX ORDER — PREDNISONE 10 MG/1
TABLET ORAL
Qty: 21 TAB | Refills: 0 | Status: SHIPPED | OUTPATIENT
Start: 2019-04-26 | End: 2019-09-24

## 2019-04-26 RX ORDER — NIFEDIPINE 30 MG/1
30 TABLET, EXTENDED RELEASE ORAL DAILY
Qty: 30 TAB | Refills: 0 | Status: SHIPPED | OUTPATIENT
Start: 2019-04-26 | End: 2019-09-24

## 2019-04-26 RX ORDER — PREDNISONE 20 MG/1
60 TABLET ORAL
Status: COMPLETED | OUTPATIENT
Start: 2019-04-26 | End: 2019-04-26

## 2019-04-26 RX ADMIN — NIFEDIPINE 30 MG: 30 TABLET, FILM COATED, EXTENDED RELEASE ORAL at 22:45

## 2019-04-26 RX ADMIN — IPRATROPIUM BROMIDE AND ALBUTEROL SULFATE 3 ML: .5; 3 SOLUTION RESPIRATORY (INHALATION) at 22:45

## 2019-04-26 RX ADMIN — PREDNISONE 60 MG: 20 TABLET ORAL at 22:45

## 2019-04-26 RX ADMIN — METOPROLOL TARTRATE 25 MG: 25 TABLET, FILM COATED ORAL at 22:45

## 2019-04-27 NOTE — DISCHARGE INSTRUCTIONS
Patient Education     Learning About Asthma Triggers  What are triggers? When you have asthma, certain things can make your symptoms worse. These are called triggers. They include:  · Cigarette smoke or air pollution. · Things you are allergic to, such as:  ¨ Pollen, mold, or dust mites. ¨ Pet hair, skin, or saliva. · Illnesses, like colds, flu, or pneumonia. · Exercise. · Dry, cold air. How do triggers affect asthma? Triggers can make it harder for your lungs to work as they should and can lead to sudden difficulty breathing and other symptoms. When you are around a trigger, an asthma attack is more likely. If your symptoms are severe, you may need emergency treatment or have to go to the hospital for treatment. If you know what your triggers are and can avoid them, you may be able to prevent asthma attacks, reduce how often you have them, and make them less severe. What can you do to avoid triggers? The first thing is to know your triggers. When you are having symptoms, note the things around you that might be causing them. Then look for patterns in what may be triggering your symptoms. Record your triggers on a piece of paper or in an asthma diary. When you have your list of possible triggers, work with your doctor to find ways to avoid them. You also can check how well your lungs are working by measuring your peak expiratory flow (PEF) throughout the day. Your PEF may drop when you are near things that trigger symptoms. Here are some ways to avoid a few common triggers. · Do not smoke or allow others to smoke around you. If you need help quitting, talk to your doctor about stop-smoking programs and medicines. These can increase your chances of quitting for good. · If there is a lot of pollution, pollen, or dust outside, stay at home and keep your windows closed. Use an air conditioner or air filter in your home.  Check your local weather report or newspaper for air quality and pollen reports. · Get a flu shot every year. Talk to your doctor about getting a pneumococcal shot. Wash your hands often to prevent infections. · Avoid exercising outdoors in cold weather. If you are outdoors in cold weather, wear a scarf around your face and breathe through your nose. How can you manage an asthma attack? · If you have an asthma action plan, follow the plan. In general:  ¨ Use your quick-relief inhaler as directed by your doctor. If your symptoms do not get better after you use your medicine, have someone take you to the emergency room. Call an ambulance if needed. ¨ If your doctor has given you other inhaled medicines or steroid pills, take them as directed. Where can you learn more? Go to [employer]  Enter M564 in the search box to learn more about \"Learning About Asthma Triggers. \"   © 3225-5649 Health[employer], Inc[employer]. Care instructions adapted under license by [facility] (which disclaims liability or warranty for this information). This care instruction is for use with your licensed healthcare professional. If you have questions about a medical condition or this instruction, always ask your healthcare professional. [place] [address] any warranty or liability for your use of this information. Content Version: 09.5.905626; Last Revised: February 23, 2012               GiveLoop Activation    Thank you for requesting access to GiveLoop. Please follow the instructions below to securely access and download your online medical record. GiveLoop allows you to send messages to your doctor, view your test results, renew your prescriptions, schedule appointments, and more. How Do I Sign Up? 1. In your internet browser, go to [email or web address]  2. Click on the First Time User? Click Here link in the Sign In box. You will be redirect to the New Member Sign Up page. 3. Enter your GiveLoop Access Code exactly as it appears below.  You will not need to use this code after youve completed the sign-up process. If you do not sign up before the expiration date, you must request a new code. Tivity Access Code: [email or web address]  Expires: 6/10/2019 10:26 PM (This is the date your Tivity access code will )    4. Enter the last four digits of your Social Security Number (xxxx) and Date of Birth (mm/dd/yyyy) as indicated and click Submit. You will be taken to the next sign-up page. 5. Create a Kartelat ID. This will be your Tivity login ID and cannot be changed, so think of one that is secure and easy to remember. 6. Create a Tivity password. You can change your password at any time. 7. Enter your Password Reset Question and Answer. This can be used at a later time if you forget your password. 8. Enter your e-mail address. You will receive e-mail notification when new information is available in [address]. 9. Click Sign Up. You can now view and download portions of your medical record. 10. Click the Download Summary menu link to download a portable copy of your medical information. Additional Information    If you have questions, please visit the Frequently Asked Questions section of the Tivity website at [email or web address] Remember, Tivity is NOT to be used for urgent needs. For medical emergencies, dial 911. Complete all medications as prescribed. Follow-up with primary care doctor in 1 week. Return to the ED immediately for any new or worsening symptoms.

## 2019-04-27 NOTE — ED PROVIDER NOTES
EMERGENCY DEPARTMENT HISTORY AND PHYSICAL EXAM 
 
Date: 4/26/2019 Patient Name: Servando Blevins History of Presenting Illness Chief Complaint Patient presents with  Wheezing History Provided By:patient Chief Complaint: wheezing Duration: 1 day Timing:  acute Location: chest 
Quality: wheezy Severity:moderate Modifying Factors: none Associated Symptoms: dry cough, SOB, medication refill Additional History (Context): Servando Blevins is a 43 y.o. male with PMH htn and asthma, who presents with complaints of cough, wheezing and SOB x 1 day. Pt states he has been out of all of his medications for his asthma and htn for at least 1 week. Denies headache, chest pain, and vision changes. No other complaints. PCP: Kelvin Montenegro MD 
 
Current Facility-Administered Medications Medication Dose Route Frequency Provider Last Rate Last Dose  albuterol-ipratropium (DUO-NEB) 2.5 MG-0.5 MG/3 ML  3 mL Nebulization NOW Ginette April JAMI, FARZANA      
 predniSONE (DELTASONE) tablet 60 mg  60 mg Oral NOW Ginette April FARZANA FARRELL      
 NIFEdipine ER (PROCARDIA XL) tablet 30 mg  30 mg Oral NOW Ginette April JAMI, FARZANA      
 metoprolol tartrate (LOPRESSOR) tablet 25 mg  25 mg [facility] Current Outpatient Medications Medication Sig Dispense Refill  metoprolol tartrate (LOPRESSOR) 25 mg tablet Take 1 Tab by mouth two (2) times a day. 60 Tab 0  
 albuterol (PROVENTIL HFA, VENTOLIN HFA, PROAIR HFA) 90 mcg/actuation inhaler Take 2 Puffs by inhalation.  NIFEdipine ER (PROCARDIA XL) 30 mg ER tablet Take 1 Tab by mouth daily. 15 Tab 0 Past History Past Medical History: 
Past Medical History:  
Diagnosis Date  Asthma  Hypertension Past Surgical History: No past surgical history on file. Family History: No family history on file. Social History: 
Social History Tobacco Use  Smoking status: Current Some Day Smoker  Smokeless tobacco: Never Used Substance Use Topics  Alcohol use: No  
 Drug use: Not on file Allergies: 
No Known Allergies Review of Systems Review of Systems Constitutional: Negative. Negative for chills and fever. HENT: Negative. Negative for congestion, ear pain and rhinorrhea. Eyes: Negative. Negative for pain and redness. Respiratory: Positive for cough, shortness of breath and wheezing. Negative for stridor. Cardiovascular: Negative. Negative for chest pain and leg swelling. Gastrointestinal: Negative. Negative for abdominal pain, constipation, diarrhea, nausea and vomiting. Genitourinary: Negative. Negative for dysuria and frequency. Musculoskeletal: Negative. Negative for back pain and neck pain. Skin: Negative. Negative for rash and wound. Neurological: Negative. Negative for dizziness, seizures, syncope and headaches. All other systems reviewed and are negative. All Other Systems Negative Physical Exam  
 
Vitals:  
 04/26/19 2228 BP: (!) 193/129 Pulse: 90 Resp: 18 Temp: 98 °F (36.7 °C) SpO2: 96% Physical Exam  
Constitutional: He is oriented to person, place, and time. He appears well-developed and well-nourished. No distress. HENT:  
Head: Normocephalic and atraumatic. Eyes: Conjunctivae are normal. Right eye exhibits no discharge. Left eye exhibits no discharge. No scleral icterus. Neck: Normal range of motion. Neck supple. Cardiovascular: Normal rate, regular rhythm and normal heart sounds. Exam reveals no gallop and no friction rub. No murmur heard. Pulmonary/Chest: Effort normal. No stridor. No respiratory distress. He has wheezes. He has no rales. Few mild scattered expiratory wheezes noted on auscultation. No evidence of respiratory distress. Musculoskeletal: Normal range of motion. Neurological: He is alert and oriented to person, place, and time.  Coordination normal.  
 Gait is steady. Able to ambulate without difficulty. Skin: Skin is warm and dry. No rash noted. He is not diaphoretic. No erythema. Psychiatric: He has a normal mood and affect. His behavior is normal. Thought content normal.  
Nursing note and vitals reviewed. Diagnostic Study Results Labs - No results found for this or any previous visit (from the past 12 hour(s)). Radiologic Studies - No orders to display CT Results  (Last 48 hours) None CXR Results  (Last 48 hours) None Medical Decision Making I am the first provider for this patient. I reviewed the vital signs, available nursing notes, past medical history, past surgical history, family history and social history. Vital Signs-Reviewed the patient's vital signs. Records Reviewed: Jessica Peng PA-C Procedures: 
Procedures Provider Notes (Medical Decision Making): Impression:  Asthma exacerbation, htn, medication refill Lopressor and procardia given, duo neb and prednisone given, sx improving, will plan to d/c with medication refills and recommend close PCP follow-up. Pt agrees. Jessica Peng PA-C  
 
MED RECONCILIATION: 
Current Facility-Administered Medications Medication Dose Route Frequency  albuterol-ipratropium (DUO-NEB) 2.5 MG-0.5 MG/3 ML  3 mL Nebulization NOW  predniSONE (DELTASONE) tablet 60 mg  60 mg Oral NOW  
 NIFEdipine ER (PROCARDIA XL) tablet 30 mg  30 mg Oral NOW  metoprolol tartrate (LOPRESSOR) tablet 25 mg  25 mg Oral NOW Current Outpatient Medications Medication Sig  
 metoprolol tartrate (LOPRESSOR) 25 mg tablet Take 1 Tab by mouth two (2) times a day.  albuterol (PROVENTIL HFA, VENTOLIN HFA, PROAIR HFA) 90 mcg/actuation inhaler Take 2 Puffs by inhalation.  NIFEdipine ER (PROCARDIA XL) 30 mg ER tablet Take 1 Tab by mouth daily. Disposition: D/c 
 
DISCHARGE NOTE:  
 Patient is stable for discharge at this time. Rx for prednisone, albuterol, procardia, and lopressor given. Rest and follow-up with PCP this week. Return to the ED immediately for any new or worsening sx. Jessica Peng PA-C Diagnosis Clinical Impression asthma exacerbation, htn, medication refill

## 2019-04-27 NOTE — ED NOTES
I have reviewed discharge instructions with the patient. The patient verbalized understanding. Patient armband removed and shredded. Pt d/c'd to home awake, alert and in NAD. All questions answered. Multiple rx given

## 2019-09-24 ENCOUNTER — HOSPITAL ENCOUNTER (EMERGENCY)
Age: 39
Discharge: HOME | End: 2019-09-24
Attending: EMERGENCY MEDICINE | Admitting: EMERGENCY MEDICINE
Payer: MEDICAID

## 2019-09-24 VITALS
SYSTOLIC BLOOD PRESSURE: 159 MMHG | OXYGEN SATURATION: 96 % | HEIGHT: 72 IN | TEMPERATURE: 98.4 F | BODY MASS INDEX: 41.17 KG/M2 | DIASTOLIC BLOOD PRESSURE: 113 MMHG | HEART RATE: 65 BPM | WEIGHT: 304 LBS | RESPIRATION RATE: 18 BRPM

## 2019-09-24 DIAGNOSIS — L20.82 FLEXURAL ECZEMA: Primary | ICD-10-CM

## 2019-09-24 DIAGNOSIS — R03.0 ELEVATED BLOOD PRESSURE READING: ICD-10-CM

## 2019-09-24 DIAGNOSIS — Z76.0 MEDICATION REFILL: ICD-10-CM

## 2019-09-24 LAB
ANION GAP SERPL CALC-SCNC: 5 MMOL/L (ref 3–18)
BASOPHILS # BLD: 0 K/UL (ref 0–0.1)
BASOPHILS NFR BLD: 1 % (ref 0–2)
BUN SERPL-MCNC: 18 MG/DL (ref 7–18)
BUN/CREAT SERPL: 14 (ref 12–20)
CALCIUM SERPL-MCNC: 9.6 MG/DL (ref 8.5–10.1)
CHLORIDE SERPL-SCNC: 103 MMOL/L (ref 100–111)
CO2 SERPL-SCNC: 31 MMOL/L (ref 21–32)
CREAT SERPL-MCNC: 1.28 MG/DL (ref 0.6–1.3)
DIFFERENTIAL METHOD BLD: ABNORMAL
EOSINOPHIL # BLD: 0.2 K/UL (ref 0–0.4)
EOSINOPHIL NFR BLD: 3 % (ref 0–5)
ERYTHROCYTE [DISTWIDTH] IN BLOOD BY AUTOMATED COUNT: 12.7 % (ref 11.6–14.5)
GLUCOSE SERPL-MCNC: 88 MG/DL (ref 74–99)
HCT VFR BLD AUTO: 47.2 % (ref 36–48)
HGB BLD-MCNC: 15.3 G/DL (ref 13–16)
LYMPHOCYTES # BLD: 2.1 K/UL (ref 0.9–3.6)
LYMPHOCYTES NFR BLD: 38 % (ref 21–52)
MCH RBC QN AUTO: 28.9 PG (ref 24–34)
MCHC RBC AUTO-ENTMCNC: 32.4 G/DL (ref 31–37)
MCV RBC AUTO: 89.2 FL (ref 74–97)
MONOCYTES # BLD: 0.6 K/UL (ref 0.05–1.2)
MONOCYTES NFR BLD: 11 % (ref 3–10)
NEUTS SEG # BLD: 2.7 K/UL (ref 1.8–8)
NEUTS SEG NFR BLD: 47 % (ref 40–73)
PLATELET # BLD AUTO: 209 K/UL (ref 135–420)
PMV BLD AUTO: 10.8 FL (ref 9.2–11.8)
POTASSIUM SERPL-SCNC: 3.8 MMOL/L (ref 3.5–5.5)
RBC # BLD AUTO: 5.29 M/UL (ref 4.7–5.5)
SODIUM SERPL-SCNC: 139 MMOL/L (ref 136–145)
TROPONIN I SERPL-MCNC: <0.02 NG/ML (ref 0–0.04)
WBC # BLD AUTO: 5.5 K/UL (ref 4.6–13.2)

## 2019-09-24 PROCEDURE — 80048 BASIC METABOLIC PNL TOTAL CA: CPT

## 2019-09-24 PROCEDURE — 85025 COMPLETE CBC W/AUTO DIFF WBC: CPT

## 2019-09-24 PROCEDURE — 74011250637 HC RX REV CODE- 250/637: Performed by: PHYSICIAN ASSISTANT

## 2019-09-24 PROCEDURE — 99285 EMERGENCY DEPT VISIT HI MDM: CPT

## 2019-09-24 PROCEDURE — 84484 ASSAY OF TROPONIN QUANT: CPT

## 2019-09-24 PROCEDURE — 93005 ELECTROCARDIOGRAM TRACING: CPT

## 2019-09-24 RX ORDER — NIFEDIPINE 30 MG/1
30 TABLET, EXTENDED RELEASE ORAL DAILY
Qty: 30 TAB | Refills: 0 | Status: SHIPPED | OUTPATIENT
Start: 2019-09-24 | End: 2020-07-13 | Stop reason: SDUPTHER

## 2019-09-24 RX ORDER — NIFEDIPINE 30 MG/1
30 TABLET, EXTENDED RELEASE ORAL
Status: COMPLETED | OUTPATIENT
Start: 2019-09-24 | End: 2019-09-24

## 2019-09-24 RX ORDER — ALBUTEROL SULFATE 90 UG/1
2 AEROSOL, METERED RESPIRATORY (INHALATION)
Qty: 1 INHALER | Refills: 0 | Status: SHIPPED | OUTPATIENT
Start: 2019-09-24 | End: 2020-04-22

## 2019-09-24 RX ORDER — METOPROLOL TARTRATE 25 MG/1
25 TABLET, FILM COATED ORAL 2 TIMES DAILY
Qty: 60 TAB | Refills: 0 | Status: SHIPPED | OUTPATIENT
Start: 2019-09-24 | End: 2020-07-13 | Stop reason: SDUPTHER

## 2019-09-24 RX ORDER — CLONIDINE HYDROCHLORIDE 0.1 MG/1
0.1 TABLET ORAL
Status: COMPLETED | OUTPATIENT
Start: 2019-09-24 | End: 2019-09-24

## 2019-09-24 RX ORDER — METOPROLOL SUCCINATE 50 MG/1
25 TABLET, EXTENDED RELEASE ORAL
Status: COMPLETED | OUTPATIENT
Start: 2019-09-24 | End: 2019-09-24

## 2019-09-24 RX ORDER — TRIAMCINOLONE ACETONIDE 1 MG/G
OINTMENT TOPICAL 2 TIMES DAILY
Qty: 30 G | Refills: 0 | Status: SHIPPED | OUTPATIENT
Start: 2019-09-24 | End: 2020-04-22

## 2019-09-24 RX ADMIN — METOPROLOL SUCCINATE 25 MG: 50 TABLET, EXTENDED RELEASE ORAL at 14:49

## 2019-09-24 RX ADMIN — NIFEDIPINE 30 MG: 30 TABLET, FILM COATED, EXTENDED RELEASE ORAL at 14:50

## 2019-09-24 RX ADMIN — CLONIDINE HYDROCHLORIDE 0.1 MG: 0.1 TABLET ORAL at 16:01

## 2019-09-24 NOTE — DISCHARGE INSTRUCTIONS
Patient Education      Take medication as prescribed. Follow-up with your primary care physician within 2 days for reassessment. Bring the results from this visit with you for their review. Return to the ED immediately for any new, worsening, or persistent symptoms. Dermatitis: Care Instructions  Your Care Instructions  Dermatitis is the general name used for any rash or inflammation of the skin. Different kinds of dermatitis cause different kinds of rashes. Common causes of a rash include new medicines, plants (such as poison oak or poison ivy), heat, and stress. Certain illnesses can also cause a rash. An allergic reaction to something that touches your skin, such as latex, nickel, or poison ivy, is called contact dermatitis. Contact dermatitis may also be caused by something that irritates the skin, such as bleach, a chemical, or soap. These types of rashes cannot be spread from person to person. How long your rash will last depends on what caused it. Rashes may last a few days or months. Follow-up care is a key part of your treatment and safety. Be sure to make and go to all appointments, and call your doctor if you are having problems. It's also a good idea to know your test results and keep a list of the medicines you take. How can you care for yourself at home? · Do not scratch the rash. Cut your nails short, and file them smooth. Or wear gloves if this helps keep you from scratching. · Wash the area with water only. Pat dry. · Put cold, wet cloths on the rash to reduce itching. · Keep cool, and stay out of the sun. · Leave the rash open to the air as much as possible. · If the rash itches, use hydrocortisone cream. Follow the directions on the label. Calamine lotion may help for plant rashes. · Take an over-the-counter antihistamine, such as diphenhydramine (Benadryl) or loratadine (Claritin), to help calm the itching. Read and follow all instructions on the label.   · If your doctor prescribed a cream, use it as directed. If your doctor prescribed medicine, take it exactly as directed. When should you call for help? Call your doctor now or seek immediate medical care if:    · You have symptoms of infection, such as:  ? Increased pain, swelling, warmth, or redness. ? Red streaks leading from the area. ? Pus draining from the area. ? A fever.     · You have joint pain along with the rash.    Watch closely for changes in your health, and be sure to contact your doctor if:    · Your rash is changing or getting worse.     · You are not getting better as expected. Where can you learn more? Go to http://bianca-arturo.info/. Enter [phone] in the search box to learn more about \"Dermatitis: Care Instructions. \"  Current as of: April 1, 2019  Content Version: 12.2  © 5996-0415 [employer]. Care instructions adapted under license by [employer] (which disclaims liability or warranty for this information). If you have questions about a medical condition or this instruction, always ask your healthcare professional. [place] [address] any warranty or liability for your use of this information. Patient Education        Elevated Blood Pressure: Care Instructions  Your Care Instructions    Blood pressure is a measure of how hard the blood pushes against the walls of your arteries. It's normal for blood pressure to go up and down throughout the day. But if it stays up over time, you have high blood pressure. Two numbers tell you your blood pressure. The first number is the systolic pressure. It shows how hard the blood pushes when your heart is pumping. The second number is the diastolic pressure. It shows how hard the blood pushes between heartbeats, when your heart is relaxed and filling with blood. An ideal blood pressure in adults is less than 120/80 (say \"120 over 80\"). High blood pressure is 140/90 or higher.  You have high blood pressure if your top number is 140 or higher or your bottom number is 90 or higher, or both. The main test for high blood pressure is simple, fast, and painless. To diagnose high blood pressure, your doctor will test your blood pressure at different times. After testing your blood pressure, your doctor may ask you to test it again when you are home. If you are diagnosed with high blood pressure, you can work with your doctor to make a long-term plan to manage it. Follow-up care is a key part of your treatment and safety. Be sure to make and go to all appointments, and call your doctor if you are having problems. It's also a good idea to know your test results and keep a list of the medicines you take. How can you care for yourself at home? · Do not smoke. Smoking increases your risk for heart attack and stroke. If you need help quitting, talk to your doctor about stop-smoking programs and medicines. These can increase your chances of quitting for good. · Stay at a healthy weight. · Try to limit how much sodium you eat to less than 2,300 milligrams (mg) a day. Your doctor may ask you to try to eat less than 1,500 mg a day. · Be physically active. Get at least 30 minutes of exercise on most days of the week. Walking is a good choice. You also may want to do other activities, such as running, swimming, cycling, or playing tennis or team sports. · Avoid or limit alcohol. Talk to your doctor about whether you can drink any alcohol. · Eat plenty of fruits, vegetables, and low-fat dairy products. Eat less saturated and total fats. · Learn how to check your blood pressure at home. When should you call for help? Call your doctor now or seek immediate medical care if:  ? · Your blood pressure is much higher than normal (such as 180/110 or higher). ? · You think high blood pressure is causing symptoms such as:  ¨ Severe headache. ¨ Blurry vision. ? Watch closely for changes in your health, and be sure to contact your doctor if:  ? · You do not get better as expected. Where can you learn more? Go to http://bianca-arturo.info/. Enter M856 in the search box to learn more about \"Elevated Blood Pressure: Care Instructions. \"  Current as of: September 21, 2016  Content Version: 11.4  © 5560-5886 [employer].io. Care instructions adapted under license by "[employer] (which disclaims liability or warranty for this information). If you have questions about a medical condition or this instruction, always ask your healthcare professional. [address] any warranty or liability for your use of this information.

## 2019-09-24 NOTE — ED PROVIDER NOTES
EMERGENCY DEPARTMENT HISTORY AND PHYSICAL EXAM    1:44 PM      Date: 9/24/2019  Patient Name: Kim Milligan    History of Presenting Illness     Chief Complaint   Patient presents with    Wheezing    Skin Problem         History Provided By: Patient    Additional History (Context): Kim Milligan is a 35 y.o. male with hx of asthma, HTN who presents with c/o pruritic rash to his arms x 2-3 days. Notes hx of similar rash in the past.  Denies fever or chills, family members with similar rash, new lotions/ detergents/foods. Did not use any medication for the symptoms prior to arrival. Also requesting refill for his inhaler and blood pressure medication. Denies dizziness, headache, chest pain, dyspnea, wheezing, numbness/tingling, n/v.     PCP: Ivon Aranda MD    Current Outpatient Medications   Medication Sig Dispense Refill    triamcinolone acetonide (KENALOG) 0.1 % ointment Apply  to affected area two (2) times a day. use thin layer 30 g 0    albuterol (PROVENTIL HFA, VENTOLIN HFA, PROAIR HFA) 90 mcg/actuation inhaler Take 2 Puffs by inhalation every four (4) hours as needed for Wheezing. 1 Inhaler 0    metoprolol tartrate (LOPRESSOR) 25 mg tablet Take 1 Tab by mouth two (2) times a day. 60 Tab 0    NIFEdipine ER (PROCARDIA XL) 30 mg ER tablet Take 1 Tab by mouth daily. 30 Tab 0       Past History     Past Medical History:  Past Medical History:   Diagnosis Date    Asthma     Hypertension        Past Surgical History:  History reviewed. No pertinent surgical history. Family History:  History reviewed. No pertinent family history. Social History:  Social History     Tobacco Use    Smoking status: Former Smoker    Smokeless tobacco: Never Used   Substance Use Topics    Alcohol use: No    Drug use: Yes     Types: Marijuana     Comment: sometimes       Allergies:  No Known Allergies      Review of Systems       Review of Systems   Constitutional: Negative for chills and fever. Respiratory: Negative for shortness of breath. Cardiovascular: Negative for chest pain. Gastrointestinal: Negative for abdominal pain, nausea and vomiting. Skin: Positive for rash. Neurological: Negative for weakness. All other systems reviewed and are negative. Physical Exam     Visit Vitals  BP (!) 159/113   Pulse 65   Temp 98.4 °F (36.9 °C)   Resp 18   Ht 6' (1.829 m)   Wt 137.9 kg (304 lb)   SpO2 96%   BMI 41.23 kg/m²         Physical Exam   Constitutional: He is oriented to person, place, and time. He appears well-developed and well-nourished. No distress. HENT:   Head: Normocephalic and atraumatic. Neck: Normal range of motion. Neck supple. Cardiovascular: Normal rate, regular rhythm, normal heart sounds and intact distal pulses. Exam reveals no gallop and no friction rub. No murmur heard. Pulmonary/Chest: Effort normal and breath sounds normal. No respiratory distress. He has no wheezes. He has no rales. Musculoskeletal: Normal range of motion. Arms:  Neurological: He is alert and oriented to person, place, and time. No cranial nerve deficit. Coordination normal.   Skin: Skin is warm. Rash noted. Rash is not nodular, not pustular, not vesicular and not urticarial. He is not diaphoretic. Nursing note and vitals reviewed. Diagnostic Study Results     Labs -  Recent Results (from the past 12 hour(s))   CBC WITH AUTOMATED DIFF    Collection Time: 09/24/19  2:41 PM   Result Value Ref Range    WBC 5.5 4.6 - 13.2 K/uL    RBC 5.29 4.70 - 5.50 M/uL    HGB 15.3 13.0 - 16.0 g/dL    HCT 47.2 36.0 - 48.0 %    MCV 89.2 74.0 - 97.0 FL    MCH 28.9 24.0 - 34.0 PG    MCHC 32.4 31.0 - 37.0 g/dL    RDW 12.7 11.6 - 14.5 %    PLATELET 876 328 - 476 K/uL    MPV 10.8 9.2 - 11.8 FL    NEUTROPHILS 47 40 - 73 %    LYMPHOCYTES 38 21 - 52 %    MONOCYTES 11 (H) 3 - 10 %    EOSINOPHILS 3 0 - 5 %    BASOPHILS 1 0 - 2 %    ABS. NEUTROPHILS 2.7 1.8 - 8.0 K/UL    ABS.  LYMPHOCYTES 2.1 0.9 - 3.6 K/UL    ABS. MONOCYTES 0.6 0.05 - 1.2 K/UL    ABS. EOSINOPHILS 0.2 0.0 - 0.4 K/UL    ABS. BASOPHILS 0.0 0.0 - 0.1 K/UL    DF AUTOMATED     METABOLIC PANEL, BASIC    Collection Time: 09/24/19  2:41 PM   Result Value Ref Range    Sodium 139 136 - 145 mmol/L    Potassium 3.8 3.5 - 5.5 mmol/L    Chloride 103 100 - 111 mmol/L    CO2 31 21 - 32 mmol/L    Anion gap 5 3.0 - 18 mmol/L    Glucose 88 74 - 99 mg/dL    BUN 18 7.0 - 18 MG/DL    Creatinine 1.28 0.6 - 1.3 MG/DL    BUN/Creatinine ratio 14 12 - 20      GFR est AA >60 >60 ml/min/1.73m2    GFR est non-AA >60 >60 ml/min/1.73m2    Calcium 9.6 8.5 - 10.1 MG/DL   TROPONIN I    Collection Time: 09/24/19  2:41 PM   Result Value Ref Range    Troponin-I, QT <0.02 0.0 - 0.045 NG/ML   EKG, 12 LEAD, INITIAL    Collection Time: 09/24/19  3:06 PM   Result Value Ref Range    Ventricular Rate 69 BPM    Atrial Rate 69 BPM    P-R Interval 154 ms    QRS Duration 90 ms    Q-T Interval 362 ms    QTC Calculation (Bezet) 387 ms    Calculated P Axis 64 degrees    Calculated R Axis 26 degrees    Calculated T Axis 49 degrees    Diagnosis       Normal sinus rhythm  Septal infarct , age undetermined  Abnormal ECG  When compared with ECG of 11-OCT-2011 10:29,  Septal infarct is now present  Nonspecific T wave abnormality, worse in Anterolateral leads         Radiologic Studies -   No orders to display         Medical Decision Making   I am the first provider for this patient. I reviewed the vital signs, available nursing notes, past medical history, past surgical history, family history and social history. Vital Signs-Reviewed the patient's vital signs. Records Reviewed: Nursing Notes and Old Medical Records (Time of Review: 1:44 PM)    ED Course: Progress Notes, Reevaluation, and Consults:  2:14 PM: Upon discharge, patient's blood pressure persistently elevated, 167/127. Pt asymptomatic. Denies chest pain, dyspnea, numbness/tingling, headache, dizziness.  Notes he has not had his blood pressure medication x 2 months. Will order EKG, labs, administer home medication and reassess with repeat vitals. One or more blood pressure readings were noted elevated during the patient's presentation in the emergency department today. This abnormal reading has been cited in the patients' diagnosis, and they have been encouraged to follow up with their primary care physician, or referred to a consultant for further evaluation and treatment. 3:34 PM Reviewed results with patient. Discussed need for close outpatient follow-up this week for reassessment. Discussed strict return precautions, including chest pain, shortness of breath, or any other medical concerns. Pt in agreement with plan. BP reduced to 159/113. Provider Notes (Medical Decision Making): 44-year-old male who presents due to upper extremity rash for 2 to 3 days. Afebrile, nontoxic-appearing, looks well. Signs and symptoms consistent with eczema. Will discharge with topical steroids and close outpatient follow-up. Also requesting refill for inhaler and blood pressure medicine. Asymptomatic. Initial dose of blood pressure medication provided in the ED. Will refill all medication. Stable for discharge with close outpatient follow-up for further assessment. Diagnosis     Clinical Impression:   1. Flexural eczema    2. Medication refill    3. Elevated blood pressure reading        Disposition: home     Follow-up Information     Follow up With Specialties Details Why [address] [facility] EMERGENCY DEPT Emergency Medicine  If symptoms worsen [address]    Sherwin Judge MD Family Practice In 2 days  [facility] [address]  [phone]             Patient's Medications   Start Taking    ALBUTEROL (PROVENTIL HFA, VENTOLIN HFA, PROAIR HFA) 90 MCG/ACTUATION INHALER    Take 2 Puffs by inhalation every four (4) hours as needed for Wheezing.     TRIAMCINOLONE ACETONIDE (KENALOG) 0.1 % OINTMENT    Apply  to affected area two (2) times a day. use thin layer   Continue Taking    No medications on file   These Medications have changed    Modified Medication Previous Medication    METOPROLOL TARTRATE (LOPRESSOR) 25 MG TABLET metoprolol tartrate (LOPRESSOR) 25 mg tablet       Take 1 Tab by mouth two (2) times a day. Take 1 Tab by mouth two (2) times a day. NIFEDIPINE ER (PROCARDIA XL) 30 MG ER TABLET NIFEdipine ER (PROCARDIA XL) 30 mg ER tablet       Take 1 Tab by mouth daily. Take 1 Tab by mouth daily. Stop Taking    ALBUTEROL (PROVENTIL HFA, VENTOLIN HFA, PROAIR HFA) 90 MCG/ACTUATION INHALER    Take 2 Puffs by inhalation every four (4) hours as needed for Wheezing. PREDNISONE (STERAPRED DS) 10 MG DOSE PACK    Use as directed       Dictation disclaimer:  Please note that this dictation was completed with [employer], the computer voice recognition software. Quite often unanticipated grammatical, syntax, homophones, and other interpretive errors are inadvertently transcribed by the computer software. Please disregard these errors. Please excuse any errors that have escaped final proofreading.

## 2019-09-25 LAB
ATRIAL RATE: 69 BPM
CALCULATED P AXIS, ECG09: 64 DEGREES
CALCULATED R AXIS, ECG10: 26 DEGREES
CALCULATED T AXIS, ECG11: 49 DEGREES
DIAGNOSIS, 93000: NORMAL
P-R INTERVAL, ECG05: 154 MS
Q-T INTERVAL, ECG07: 362 MS
QRS DURATION, ECG06: 90 MS
QTC CALCULATION (BEZET), ECG08: 387 MS
VENTRICULAR RATE, ECG03: 69 BPM

## 2020-04-22 ENCOUNTER — HOSPITAL ENCOUNTER (EMERGENCY)
Age: 40
Discharge: HOME | End: 2020-04-22
Attending: EMERGENCY MEDICINE
Payer: MEDICAID

## 2020-04-22 VITALS
RESPIRATION RATE: 20 BRPM | OXYGEN SATURATION: 96 % | HEART RATE: 89 BPM | DIASTOLIC BLOOD PRESSURE: 121 MMHG | SYSTOLIC BLOOD PRESSURE: 159 MMHG | BODY MASS INDEX: 42.66 KG/M2 | TEMPERATURE: 98.1 F | WEIGHT: 315 LBS | HEIGHT: 72 IN

## 2020-04-22 DIAGNOSIS — Z76.0 MEDICATION REFILL: ICD-10-CM

## 2020-04-22 DIAGNOSIS — L30.8 OTHER ECZEMA: Primary | ICD-10-CM

## 2020-04-22 PROCEDURE — 99282 EMERGENCY DEPT VISIT SF MDM: CPT

## 2020-04-22 RX ORDER — PREDNISONE 50 MG/1
50 TABLET ORAL DAILY
Qty: 5 TAB | Refills: 0 | Status: SHIPPED | OUTPATIENT
Start: 2020-04-22 | End: 2020-04-27

## 2020-04-22 RX ORDER — ALBUTEROL SULFATE 90 UG/1
2 AEROSOL, METERED RESPIRATORY (INHALATION)
Qty: 1 INHALER | Refills: 11 | Status: SHIPPED | OUTPATIENT
Start: 2020-04-22

## 2020-04-22 RX ORDER — TRIAMCINOLONE ACETONIDE 1 MG/G
OINTMENT TOPICAL 2 TIMES DAILY
Qty: 30 G | Refills: 0 | Status: SHIPPED | OUTPATIENT
Start: 2020-04-22

## 2020-04-22 NOTE — ED PROVIDER NOTES
EMERGENCY DEPARTMENT HISTORY AND PHYSICAL EXAM      Date: 4/22/2020  Patient Name: Ganesh Carr    History of Presenting Illness     Chief Complaint   Patient presents with    Other       History (Context): Ganesh Carr is a 35 y.o. gentleman with eczema and asthma, who presents with subacute on chronic, persistent, severe rash that is most prominent on his hands, elbows and ear. The patient states that triamcinolone cream had helped some in the past, and the patient is requesting a refill on his albuterol inhaler. Otherwise, the patient has no exacerbating/relieving features or other associated symptoms. On review of systems, the patient denies fever, chills, chest pain, bleeding, syncope. PCP: Tam Leung MD    Current Outpatient Medications   Medication Sig Dispense Refill    triamcinolone acetonide (KENALOG) 0.1 % ointment Apply  to affected area two (2) times a day. use thin layer 30 g 0    albuterol (PROVENTIL HFA, VENTOLIN HFA, PROAIR HFA) 90 mcg/actuation inhaler Take 2 Puffs by inhalation every four (4) hours as needed for Wheezing. 1 Inhaler 11    predniSONE (DELTASONE) 50 mg tablet Take 1 Tab by mouth daily for 5 days. 5 Tab 0    metoprolol tartrate (LOPRESSOR) 25 mg tablet Take 1 Tab by mouth two (2) times a day. 60 Tab 0    NIFEdipine ER (PROCARDIA XL) 30 mg ER tablet Take 1 Tab by mouth daily. 30 Tab 0       Past History     Past Medical History:  Past Medical History:   Diagnosis Date    Asthma     Hypertension        Past Surgical History:  History reviewed. No pertinent surgical history. Family History:  History reviewed. No pertinent family history.     Social History:  Social History     Tobacco Use    Smoking status: Former Smoker    Smokeless tobacco: Never Used   Substance Use Topics    Alcohol use: No    Drug use: Yes     Types: Marijuana     Comment: sometimes       Allergies:  No Known Allergies    PMH, PSH, family history, social history, allergies reviewed with the patient with significant items noted above. Review of Systems   As per HPI, otherwise reviewed and negative. Physical Exam     Vitals:    04/22/20 1036   BP: (!) 159/121   Pulse: 89   Resp: 20   Temp: 98.1 °F (36.7 °C)   SpO2: 96%   Weight: 144.2 kg (318 lb)   Height: 6' (1.829 m)       Gen: Well-appearing, in no acute distress   HEENT: Normocephalic, sclera anicteric  Cardiovascular: Normal rate, regular rhythm, no murmurs, rubs, gallops. Pulses intact and equal distally. Pulmonary: No respiratory distress. No stridor. Clear lungs. ABD: Soft, nontender, nondistended. Neuro: Alert. Normal speech. Normal mentation. Psych: Normal thought content and thought processes. : No CVA tenderness  EXT: Moves all extremities well. No cyanosis or clubbing. Skin: Rash over left face, bilateral arms, multifocal on the body, dry and patchy. Warm and well-perfused. Other:        Diagnostic Study Results     Labs -   No results found for this or any previous visit (from the past 12 hour(s)). Radiologic Studies -   No orders to display     CT Results  (Last 48 hours)    None        CXR Results  (Last 48 hours)    None            Medical Decision Making   I am the first provider for this patient. I reviewed the vital signs, available nursing notes, past medical history, past surgical history, family history and social history. Vital Signs-Reviewed the patient's vital signs. Records Reviewed: Personally, on initial evaluation    MDM:   Patient presents with eczema and request for refill of asthma medications. Exam significant for eczematous rash.    DDX considered: Eczema, asthma, need for medication refill  DDX thought to be less likely but also considered due to high risk condition: Malingering, secondary gain    Patient condition on initial evaluation: Stable    Plan:       Orders as below:  Orders Placed This Encounter    triamcinolone acetonide (KENALOG) 0.1 % ointment    albuterol (PROVENTIL HFA, VENTOLIN HFA, PROAIR HFA) 90 mcg/actuation inhaler    predniSONE (DELTASONE) 50 mg tablet        ED Course:   Stable throughout ED course    Patient condition at time of disposition: Stable  DISCHARGE NOTE:    Care plan outlined and precautions discussed. Results were reviewed with the patient. All medications were reviewed with the patient; will d/c home with albuterol, steroids. All of pt's questions and concerns were addressed. Alarm symptoms and return precautions associated with chief complaint and evaluation were reviewed with the patient in detail. The patient demonstrated adequate understanding. Patient was instructed and agrees to follow up with dermatology, as well as to return to the ED upon further deterioration. Patient is ready to go home. The patient is happy with this plan    Follow-up Information     Follow up With Specialties Details Why [address] [facility] EMERGENCY DEPT Emergency Medicine  As needed, If symptoms worsen [address] [facility]    Katerina Cantor MD Dermatology Schedule an appointment as soon as possible for a visit As needed, If symptoms worsen [address]  [address]  [phone]            Discharge Medication List as of 4/22/2020 11:27 AM          Procedures:  Procedures      Critical Care Time:     Disposition: Discharge home    Diagnosis     Clinical Impression:   1. Other eczema    2. Medication refill        Signed,  Sarina Nelson MD  Emergency Physician  [facility]    As a voice dictation software was utilized to dictate this note, minor word transpositions can occur. I apologize for confusing wording and typographic errors. Please feel free to contact me for clarification.

## 2020-04-22 NOTE — ED TRIAGE NOTES
Pt arrives for request to refill medications as his PCP retired and needs a new MD. Pt needs to have BP, inhaler and rash meds refilled. Pt has been out of all meds for a couple of months.

## 2020-07-13 ENCOUNTER — HOSPITAL ENCOUNTER (EMERGENCY)
Age: 40
Discharge: HOME | End: 2020-07-13
Attending: EMERGENCY MEDICINE
Payer: MEDICAID

## 2020-07-13 VITALS
HEIGHT: 72 IN | OXYGEN SATURATION: 93 % | TEMPERATURE: 98.2 F | DIASTOLIC BLOOD PRESSURE: 132 MMHG | WEIGHT: 315 LBS | HEART RATE: 88 BPM | SYSTOLIC BLOOD PRESSURE: 180 MMHG | BODY MASS INDEX: 42.66 KG/M2 | RESPIRATION RATE: 20 BRPM

## 2020-07-13 DIAGNOSIS — S39.012A ACUTE MYOFASCIAL STRAIN OF LUMBAR REGION, INITIAL ENCOUNTER: ICD-10-CM

## 2020-07-13 DIAGNOSIS — I10 ESSENTIAL HYPERTENSION: Primary | ICD-10-CM

## 2020-07-13 PROCEDURE — 99282 EMERGENCY DEPT VISIT SF MDM: CPT

## 2020-07-13 RX ORDER — NIFEDIPINE 30 MG/1
30 TABLET, EXTENDED RELEASE ORAL DAILY
Qty: 30 TAB | Refills: 0 | Status: SHIPPED | OUTPATIENT
Start: 2020-07-13 | End: 2020-08-12

## 2020-07-13 RX ORDER — CYCLOBENZAPRINE HCL 10 MG
10 TABLET ORAL
Qty: 15 TAB | Refills: 0 | Status: SHIPPED | OUTPATIENT
Start: 2020-07-13 | End: 2020-07-18

## 2020-07-13 RX ORDER — NAPROXEN 375 MG/1
375 TABLET ORAL 2 TIMES DAILY WITH MEALS
Qty: 20 TAB | Refills: 0 | Status: SHIPPED | OUTPATIENT
Start: 2020-07-13 | End: 2020-07-23

## 2020-07-13 RX ORDER — METOPROLOL TARTRATE 25 MG/1
25 TABLET, FILM COATED ORAL 2 TIMES DAILY
Qty: 60 TAB | Refills: 0 | Status: SHIPPED | OUTPATIENT
Start: 2020-07-13 | End: 2020-08-12

## 2020-07-13 NOTE — DISCHARGE INSTRUCTIONS
Patient Education        Back Strain: Care Instructions  Overview     A back strain happens when you overstretch, or pull, a muscle in your back. You may hurt your back in an accident or when you exercise or lift something. Sometimes you may not know how you hurt your back. Most back pain will get better with rest and time. You can take care of yourself at home to help your back heal.  Follow-up care is a key part of your treatment and safety. Be sure to make and go to all appointments, and call your doctor if you are having problems. It's also a good idea to know your test results and keep a list of the medicines you take. How can you care for yourself at home? · Try to stay as active as you can, but stop or reduce any activity that causes pain. · Put ice or a cold pack on the sore muscle for 10 to 20 minutes at a time to stop swelling. Try this every 1 to 2 hours for 3 days (when you are awake) or until the swelling goes down. Put a thin cloth between the ice pack and your skin. · After 2 or 3 days, apply a heating pad on low or a warm cloth to your back. Some doctors suggest that you go back and forth between hot and cold treatments. · Take pain medicines exactly as directed. ? If the doctor gave you a prescription medicine for pain, take it as prescribed. ? If you are not taking a prescription pain medicine, ask your doctor if you can take an over-the-counter medicine. · Try sleeping on your side with a pillow between your legs. Or put a pillow under your knees when you lie on your back. These measures can ease pain in your lower back. · Return to your usual level of activity slowly. When should you call for help? [id number] anytime you think you may need emergency care. For example, call if:  · You are unable to move a leg at all. Call your doctor now or seek immediate medical care if:  · You have new or worse symptoms in your legs, belly, or buttocks.  Symptoms may include:  ? Numbness or tingling. ? Weakness. ? Pain. · You lose bladder or bowel control. Watch closely for changes in your health, and be sure to contact your doctor if:  · You have a fever, lose weight, or don't feel well. · You are not getting better as expected. Where can you learn more? Go to http://bianca-arturo.info/  Enter Y913 in the search box to learn more about \"Back Strain: Care Instructions. \"  Current as of: March 2, 2020               Content Version: 12.5  © 4033-8034 [employer] Care instructions adapted under license by [employer] (which disclaims liability or warranty for this information). If you have questions about a medical condition or this instruction, always ask your healthcare professional. [address] any warranty or liability for your use of this information. Patient Education        High Blood Pressure: Care Instructions  Overview     It's normal for blood pressure to go up and down throughout the day. But if it stays up, you have high blood pressure. Another name for high blood pressure is hypertension. Despite what a lot of people think, high blood pressure usually doesn't cause headaches or make you feel dizzy or lightheaded. It usually has no symptoms. But it does increase your risk of stroke, heart attack, and other problems. You and your doctor will talk about your risks of these problems based on your blood pressure. Your doctor will give you a goal for your blood pressure. Your goal will be based on your health and your age. Lifestyle changes, such as eating healthy and being active, are always important to help lower blood pressure. You might also take medicine to reach your blood pressure goal.  Follow-up care is a key part of your treatment and safety. Be sure to make and go to all appointments, and call your doctor if you are having problems.  It's also a good idea to know your test results and keep a list of the medicines you take. How can you care for yourself at home? Medical treatment  · If you stop taking your medicine, your blood pressure will go back up. You may take one or more types of medicine to lower your blood pressure. Be safe with medicines. Take your medicine exactly as prescribed. Call your doctor if you think you are having a problem with your medicine. · Talk to your doctor before you start taking aspirin every day. Aspirin can help certain people lower their risk of a heart attack or stroke. But taking aspirin isn't right for everyone, because it can cause serious bleeding. · See your doctor regularly. You may need to see the doctor more often at first or until your blood pressure comes down. · If you are taking blood pressure medicine, talk to your doctor before you take decongestants or anti-inflammatory medicine, such as ibuprofen. Some of these medicines can raise blood pressure. · Learn how to check your blood pressure at home. Lifestyle changes  · Stay at a healthy weight. This is especially important if you put on weight around the waist. Losing even 10 pounds can help you lower your blood pressure. · If your doctor recommends it, get more exercise. Walking is a good choice. Bit by bit, increase the amount you walk every day. Try for at least 30 minutes on most days of the week. You also may want to swim, bike, or do other activities. · Avoid or limit alcohol. Talk to your doctor about whether you can drink any alcohol. · Try to limit how much sodium you eat to less than 2,300 milligrams (mg) a day. Your doctor may ask you to try to eat less than 1,500 mg a day. · Eat plenty of fruits (such as bananas and oranges), vegetables, legumes, whole grains, and low-fat dairy products. · Lower the amount of saturated fat in your diet. Saturated fat is found in animal products such as milk, cheese, and meat.  Limiting these foods may help you lose weight and also lower your risk for heart disease. · Do not smoke. Smoking increases your risk for heart attack and stroke. If you need help quitting, talk to your doctor about stop-smoking programs and medicines. These can increase your chances of quitting for good. When should you call for help? Call  911 anytime you think you may need emergency care. This may mean having symptoms that suggest that your blood pressure is causing a serious heart or blood vessel problem. Your blood pressure may be over 180/120. For example, call 911 if:  · You have symptoms of a heart attack. These may include:  ? Chest pain or pressure, or a strange feeling in the chest.  ? Sweating. ? Shortness of breath. ? Nausea or vomiting. ? Pain, pressure, or a strange feeling in the back, neck, jaw, or upper belly or in one or both shoulders or arms. ? Lightheadedness or sudden weakness. ? A fast or irregular heartbeat. · You have symptoms of a stroke. These may include:  ? Sudden numbness, tingling, weakness, or loss of movement in your face, arm, or leg, especially on only one side of your body. ? Sudden vision changes. ? Sudden trouble speaking. ? Sudden confusion or trouble understanding simple statements. ? Sudden problems with walking or balance. ? A sudden, severe headache that is different from past headaches. · You have severe back or belly pain. Do not wait until your blood pressure comes down on its own. Get help right away. Call your doctor now or seek immediate care if:  · Your blood pressure is much higher than normal (such as 180/120 or higher), but you don't have symptoms. · You think high blood pressure is causing symptoms, such as:  ? Severe headache.  ? Blurry vision. Watch closely for changes in your health, and be sure to contact your doctor if:  · Your blood pressure measures higher than your doctor recommends at least 2 times. That means the top number is higher or the bottom number is higher, or both.   · You think you may be having side effects from your blood pressure medicine. Where can you learn more? Go to http://bianca-arturo.info/  Enter [id number] in the search box to learn more about \"High Blood Pressure: Care Instructions. \"  Current as of: December 16, 2019               Content Version: 12.5  © 0464-7593 [employer]. Care instructions adapted under license by [employer] (which disclaims liability or warranty for this information). If you have questions about a medical condition or this instruction, always ask your healthcare professional. [address] any warranty or liability for your use of this information.

## 2020-07-13 NOTE — ED TRIAGE NOTES
Pt arrives with c/o low back pain for 3 days. Pt also c/o exzema flare. Pt states he can't get out of bed without help.

## 2020-07-13 NOTE — LETTER
NOTIFICATION RETURN TO WORK / SCHOOL 
 
7/13/2020 12:19 PM 
 
Mr. Sallie Horvath [address] [address] To Whom It May Concern: 
 
Sallie Horvath is currently under the care of [facility] EMERGENCY DEPT. He will return to work tomorrow with no limitations. If there are questions or concerns please have the patient contact our office.  
 
 
 
Sincerely, 
 
 
Belgica Freeman MD

## 2020-07-13 NOTE — ED PROVIDER NOTES
EMERGENCY DEPARTMENT HISTORY AND PHYSICAL EXAM      Date: 7/13/2020  Patient Name: Janina Arteaga    History of Presenting Illness     Chief Complaint   Patient presents with    Back Pain    Rash       History Provided By: Patient    Chief Complaint: lumbar strain; hypertension    Additional History (Context): Janina Arteaga is a 38 y.o. male who presents with lumbar strain and hypertension. Patient states that a week ago he had a twisting and bending injury and had acute onset of left lateral lumbar spine pain. Pain is been on and off since then, worse with twisting and bending, gone when he is laying down in bed. Has tried some over-the-counter Motrin with minimal relief. The pain is mild to moderate intensity, no radiation, never had this before, no bowel or bladder dysfunction, no focal weakness or paresthesia. Patient also states that he has known hypertension but has not been in to see his primary care doctor to get his medications refilled. States that he had an appointment last week, however his car broke down so he was unable to make it. However, his car is fixed now, and he is calling to make a appointment sometime in the next couple weeks. States that he went to work this morning, however was unable to stay because of his back pain, so reported to emergency department for evaluation and treatment. PCP: Lang Carvajal MD    Current Outpatient Medications   Medication Sig Dispense Refill    metoprolol tartrate (LOPRESSOR) 25 mg tablet Take 1 Tab by mouth two (2) times a day for 30 days. 60 Tab 0    NIFEdipine ER (Procardia XL) 30 mg ER tablet Take 1 Tab by mouth daily for 30 days. 30 Tab 0    naproxen (NAPROSYN) 375 mg tablet Take 1 Tab by mouth two (2) times daily (with meals) for 10 days. Indications: pain 20 Tab 0    cyclobenzaprine (FLEXERIL) 10 mg tablet Take 1 Tab by mouth three (3) times daily as needed for Muscle Spasm(s) for up to 5 days.  15 Tab 0    triamcinolone acetonide (KENALOG) 0.1 % ointment Apply  to affected area two (2) times a day. use thin layer 30 g 0    albuterol (PROVENTIL HFA, VENTOLIN HFA, PROAIR HFA) 90 mcg/actuation inhaler Take 2 Puffs by inhalation every four (4) hours as needed for Wheezing. 1 Inhaler 11       Past History     Past Medical History:  Past Medical History:   Diagnosis Date    Asthma     Hypertension        Past Surgical History:  History reviewed. No pertinent surgical history. Family History:  History reviewed. No pertinent family history. Social History:  Social History     Tobacco Use    Smoking status: Former Smoker    Smokeless tobacco: Never Used   Substance Use Topics    Alcohol use: No    Drug use: Yes     Types: Marijuana     Comment: sometimes       Allergies:  No Known Allergies      Review of Systems   Review of Systems   Constitutional: Negative for chills, fatigue and fever. HENT: Negative for congestion, rhinorrhea, sore throat and trouble swallowing. Eyes: Negative for discharge, redness and itching. Respiratory: Negative for cough, shortness of breath, wheezing and stridor. Cardiovascular: Negative for chest pain, palpitations and leg swelling. Gastrointestinal: Negative for abdominal pain, blood in stool, diarrhea, nausea and vomiting. Genitourinary: Negative for difficulty urinating and dysuria. Musculoskeletal: Positive for back pain and myalgias. Negative for arthralgias, gait problem, joint swelling, neck pain and neck stiffness. Skin: Negative for rash. Neurological: Negative for syncope and light-headedness. Psychiatric/Behavioral: Negative for behavioral problems and confusion. All other systems reviewed and are negative. Physical Exam     Vitals:    07/13/20 1202   BP: (!) 180/132   Pulse: 88   Resp: 20   Temp: 98.2 °F (36.8 °C)   SpO2: 93%   Weight: 151.5 kg (334 lb)   Height: 6' (1.829 m)     Physical Exam  Vitals signs and nursing note reviewed.    Constitutional: General: He is not in acute distress. Appearance: Normal appearance. He is well-developed. He is obese. HENT:      Head: Normocephalic and atraumatic. Nose: Nose normal.      Mouth/Throat:      Mouth: Mucous membranes are moist.      Pharynx: Oropharynx is clear. Eyes:      Extraocular Movements: Extraocular movements intact. Conjunctiva/sclera: Conjunctivae normal.      Pupils: Pupils are equal, round, and reactive to light. Neck:      Musculoskeletal: Normal range of motion and neck supple. Cardiovascular:      Rate and Rhythm: Normal rate and regular rhythm. Heart sounds: Normal heart sounds. No murmur. Pulmonary:      Effort: Pulmonary effort is normal.      Breath sounds: Normal breath sounds. No wheezing. Abdominal:      General: Bowel sounds are normal.      Palpations: Abdomen is soft. Tenderness: There is no abdominal tenderness. Musculoskeletal: Normal range of motion. General: No tenderness. Comments: No midline or bony spinous tenderness to palpation. There is left mid lumbar paraspinous muscle spasm and tenderness to palpation which reproduces his pain. Negative straight leg raise bilaterally. Normal strength and sensation bilateral lower extremities. Skin:     General: Skin is warm and dry. Capillary Refill: Capillary refill takes less than 2 seconds. Neurological:      General: No focal deficit present. Mental Status: He is alert and oriented to person, place, and time. Psychiatric:         Mood and Affect: Mood normal.         Behavior: Behavior normal.         Thought Content: Thought content normal.         Judgment: Judgment normal.           Diagnostic Study Results     Labs -   No results found for this or any previous visit (from the past 12 hour(s)).     Radiologic Studies -   No orders to display     CT Results  (Last 48 hours)    None        CXR Results  (Last 48 hours)    None            Medical Decision Making   I am the first provider for this patient. I reviewed the vital signs, available nursing notes, past medical history, past surgical history, family history and social history. Vital Signs-Reviewed the patient's vital signs. Records Reviewed: Nursing Notes and Old Medical Records    ED Course:   Remained stable during his emergency department stay    Disposition:  Discharge home    DISCHARGE NOTE:     Pt has been reexamined. Patient has no new complaints, changes, or physical findings. Care plan outlined and precautions discussed. All medications were reviewed with the patient; will d/c home with Naprosyn, Flexeril, hydrochlorothiazide, metoprolol. All of pt's questions and concerns were addressed. Patient was instructed and agrees to follow up with his primary care provider, as well as to return to the ED upon further deterioration. Patient is ready to go home. Follow-up Information     Follow up With Specialties Details Why Contact Info    Kristi Woods MD [facility] Call today  [address] [place] [id number] [phone]      [facility] EMERGENCY DEPT Emergency Medicine  As needed, If symptoms worsen [address] [facility] [address]  [phone]          Current Discharge Medication List      START taking these medications    Details   naproxen (NAPROSYN) 375 mg tablet Take 1 Tab by mouth two (2) times daily (with meals) for 10 days. Indications: pain  Qty: 20 Tab, Refills: 0      cyclobenzaprine (FLEXERIL) 10 mg tablet Take 1 Tab by mouth three (3) times daily as needed for Muscle Spasm(s) for up to 5 days. Qty: 15 Tab, Refills: 0         CONTINUE these medications which have CHANGED    Details   metoprolol tartrate (LOPRESSOR) 25 mg tablet Take 1 Tab by mouth two (2) times a day for 30 days. Qty: 60 Tab, Refills: 0      NIFEdipine ER (Procardia XL) 30 mg ER tablet Take 1 Tab by mouth daily for 30 days.   Qty: 30 Tab, Refills: 0         CONTINUE these medications which have NOT CHANGED    Details   triamcinolone acetonide (KENALOG) 0.1 % ointment Apply  to affected area two (2) times a day. use thin layer  Qty: 30 g, Refills: 0      albuterol (PROVENTIL HFA, VENTOLIN HFA, PROAIR HFA) 90 mcg/actuation inhaler Take 2 Puffs by inhalation every four (4) hours as needed for Wheezing. Qty: 1 Inhaler, Refills: 11             Provider Notes (Medical Decision Making):   Acute lumbar strain, no evidence of fracture or cauda equina or other acute life threat. Symptomatic care. Also needs refill of his blood pressure medications. Diagnosis     Clinical Impression:   1. Essential hypertension    2.  Acute myofascial strain of lumbar region, initial encounter